# Patient Record
Sex: FEMALE | Race: ASIAN | NOT HISPANIC OR LATINO | Employment: FULL TIME | URBAN - METROPOLITAN AREA
[De-identification: names, ages, dates, MRNs, and addresses within clinical notes are randomized per-mention and may not be internally consistent; named-entity substitution may affect disease eponyms.]

---

## 2023-10-25 PROBLEM — Z76.89 ENCOUNTER TO ESTABLISH CARE: Status: ACTIVE | Noted: 2023-10-25

## 2023-11-02 NOTE — PROGRESS NOTES
"Date: 11/08/2023  Patient ID: [id number]   Chief Complaint: Establish Care (New patient to establish care)    HPI:   Rosalba Alejandro is a 23 y.o. female who is here today to establish care  On contraceptives that was started in [facility] for PCOS. LMP 10/14/2023, regular by OCP. Would like TV US and OBGYN referral to check if improving. Also would like to check Hep panel since  is HepB positive starting 2022.    Past Medical History:   Diagnosis Date    Anemia, unspecified     Exposure to hepatitis B     PCOS (polycystic ovarian syndrome)     9 mos on contraceptive, keeps regular    Recurrent UTI      History reviewed. No pertinent surgical history.  Review of patient's allergies indicates:  No Known Allergies  Outpatient Medications Marked as Taking for the 11/8/23 encounter (Office Visit) with Isabel Schrader MD   Medication Sig Dispense Refill    estradioL (ESTRACE) 1 MG tablet Take 1 mg by mouth once daily.       Family History   Problem Relation Age of Onset    Hypertension Mother       Social History     Socioeconomic History    Marital status:    Occupational History    Occupation: nurse at [employer]   Tobacco Use    Smoking status: Never    Smokeless tobacco: Never   Substance and Sexual Activity    Alcohol use: Not Currently     Comment: occasional    Drug use: Never    Sexual activity: Yes     Partners: Male     Birth control/protection: OCP     Comment:    Social History Narrative    planning     Patient Care Team:  Isabel Schrader MD as PCP - General (Family Medicine)   Subjective:   ROS  See HPI for details  All Other ROS: Negative except as stated in HPI  Objective:   /86   Pulse 94   Temp 98.9 °F (37.2 °C)   Ht 4' 11" (1.499 m)   Wt 70.4 kg (155 lb 3.2 oz)   SpO2 98%   BMI 31.35 kg/m²   Physical Exam  General: NAD  Eye: EOMI  HENT: no nasal discharge  Respiratory: non-labored breathing  Musculoskeletal: ambulates independently. No obvious deformity  Integumentary: no " apparent discoloration  Neurologic: Alert, oriented to person and situation  Cognition and Speech: Speech clear and coherent.   Psychiatric: Cooperative  Assessment:       ICD-10-CM ICD-9-CM   1. Encounter to establish care  Z76.89 V65.8   2. PCOS (polycystic ovarian syndrome)  E28.2 256.4   3. Exposure to hepatitis B  Z20.5 V01.79      Plan:   1. Encounter to establish care  Overview:  Obtain routine labs  F/u for wellness      Orders:  -     CBC Auto Differential; Future; Expected date: 11/08/2023  -     Comprehensive Metabolic Panel; Future; Expected date: 11/08/2023  -     Lipid Panel; Future; Expected date: 11/08/2023  -     TSH; Future; Expected date: 11/08/2023  -     Hemoglobin A1C; Future; Expected date: 11/08/2023  -     Urinalysis; Future; Expected date: 11/08/2023  -     T4, Free; Future; Expected date: 11/08/2023  -     Vitamin D; Future; Expected date: 11/08/2023  -     Hepatitis C Antibody; Future; Expected date: 11/08/2023  -     HIV 1/2 Ag/Ab (4th Gen); Future; Expected date: 11/08/2023    2. PCOS (polycystic ovarian syndrome)  Overview:  Continue contraceptive  F/u with US and OBGYN    Orders:  -     US Pelvis Complete Non OB; Future; Expected date: 11/08/2023  -     Ambulatory referral/consult to Obstetrics / Gynecology; Future; Expected date: 11/15/2023    3. Exposure to hepatitis B  Overview:  Obtain hepatitis panel    Orders:  -     Hepatitis Panel, Acute; Future; Expected date: 11/08/2023                Follow up in about 4 weeks (around 12/6/2023) for Wellness. In addition to their scheduled follow up, the patient has also been instructed to follow up on as needed basis.

## 2023-11-08 ENCOUNTER — OFFICE VISIT (OUTPATIENT)
Dept: PRIMARY CARE CLINIC | Facility: CLINIC | Age: 23
End: 2023-11-08
Payer: COMMERCIAL

## 2023-11-08 VITALS
DIASTOLIC BLOOD PRESSURE: 86 MMHG | HEIGHT: 59 IN | OXYGEN SATURATION: 98 % | WEIGHT: 155.19 LBS | BODY MASS INDEX: 31.28 KG/M2 | SYSTOLIC BLOOD PRESSURE: 123 MMHG | TEMPERATURE: 99 F | HEART RATE: 94 BPM

## 2023-11-08 DIAGNOSIS — E28.2 PCOS (POLYCYSTIC OVARIAN SYNDROME): ICD-10-CM

## 2023-11-08 DIAGNOSIS — Z20.5 EXPOSURE TO HEPATITIS B: ICD-10-CM

## 2023-11-08 DIAGNOSIS — Z76.89 ENCOUNTER TO ESTABLISH CARE: Primary | ICD-10-CM

## 2023-11-08 PROCEDURE — 3008F PR BODY MASS INDEX (BMI) DOCUMENTED: ICD-10-PCS | Mod: CPTII,,, | Performed by: STUDENT IN AN ORGANIZED HEALTH CARE EDUCATION/TRAINING PROGRAM

## 2023-11-08 PROCEDURE — 99204 PR OFFICE/OUTPT VISIT, NEW, LEVL IV, 45-59 MIN: ICD-10-PCS | Mod: ,,, | Performed by: STUDENT IN AN ORGANIZED HEALTH CARE EDUCATION/TRAINING PROGRAM

## 2023-11-08 PROCEDURE — 3074F PR MOST RECENT SYSTOLIC BLOOD PRESSURE < 130 MM HG: ICD-10-PCS | Mod: CPTII,,, | Performed by: STUDENT IN AN ORGANIZED HEALTH CARE EDUCATION/TRAINING PROGRAM

## 2023-11-08 PROCEDURE — 1159F PR MEDICATION LIST DOCUMENTED IN MEDICAL RECORD: ICD-10-PCS | Mod: CPTII,,, | Performed by: STUDENT IN AN ORGANIZED HEALTH CARE EDUCATION/TRAINING PROGRAM

## 2023-11-08 PROCEDURE — 99204 OFFICE O/P NEW MOD 45 MIN: CPT | Mod: ,,, | Performed by: STUDENT IN AN ORGANIZED HEALTH CARE EDUCATION/TRAINING PROGRAM

## 2023-11-08 PROCEDURE — 1159F MED LIST DOCD IN RCRD: CPT | Mod: CPTII,,, | Performed by: STUDENT IN AN ORGANIZED HEALTH CARE EDUCATION/TRAINING PROGRAM

## 2023-11-08 PROCEDURE — 3079F PR MOST RECENT DIASTOLIC BLOOD PRESSURE 80-89 MM HG: ICD-10-PCS | Mod: CPTII,,, | Performed by: STUDENT IN AN ORGANIZED HEALTH CARE EDUCATION/TRAINING PROGRAM

## 2023-11-08 PROCEDURE — 3008F BODY MASS INDEX DOCD: CPT | Mod: CPTII,,, | Performed by: STUDENT IN AN ORGANIZED HEALTH CARE EDUCATION/TRAINING PROGRAM

## 2023-11-08 PROCEDURE — 1160F PR REVIEW ALL MEDS BY PRESCRIBER/CLIN PHARMACIST DOCUMENTED: ICD-10-PCS | Mod: CPTII,,, | Performed by: STUDENT IN AN ORGANIZED HEALTH CARE EDUCATION/TRAINING PROGRAM

## 2023-11-08 PROCEDURE — 3074F SYST BP LT 130 MM HG: CPT | Mod: CPTII,,, | Performed by: STUDENT IN AN ORGANIZED HEALTH CARE EDUCATION/TRAINING PROGRAM

## 2023-11-08 PROCEDURE — 3079F DIAST BP 80-89 MM HG: CPT | Mod: CPTII,,, | Performed by: STUDENT IN AN ORGANIZED HEALTH CARE EDUCATION/TRAINING PROGRAM

## 2023-11-08 PROCEDURE — 1160F RVW MEDS BY RX/DR IN RCRD: CPT | Mod: CPTII,,, | Performed by: STUDENT IN AN ORGANIZED HEALTH CARE EDUCATION/TRAINING PROGRAM

## 2023-11-08 RX ORDER — ESTRADIOL 1 MG/1
1 TABLET ORAL DAILY
COMMUNITY
End: 2023-12-18 | Stop reason: SDUPTHER

## 2023-11-13 ENCOUNTER — HOSPITAL ENCOUNTER (OUTPATIENT)
Dept: RADIOLOGY | Facility: HOSPITAL | Age: 23
Discharge: HOME | End: 2023-11-13
Attending: STUDENT IN AN ORGANIZED HEALTH CARE EDUCATION/TRAINING PROGRAM
Payer: COMMERCIAL

## 2023-11-13 DIAGNOSIS — E28.2 PCOS (POLYCYSTIC OVARIAN SYNDROME): ICD-10-CM

## 2023-11-13 PROCEDURE — 76856 US EXAM PELVIC COMPLETE: CPT | Mod: TC

## 2023-11-14 NOTE — PROGRESS NOTES
Recent US showed normal pelvis, There is no sonographic evidence of polycystic ovarian syndrome.      Thank you,    Dr. BRAXTON

## 2023-11-27 PROBLEM — Z00.00 WELLNESS EXAMINATION: Status: ACTIVE | Noted: 2023-10-25

## 2023-11-27 NOTE — ASSESSMENT & PLAN NOTE
Provided Rosalba Britt Alejandro with a 5-10 year written screening schedule and personal prevention plan. Recommendations were developed using the USPSTF age appropriate recommendations. Education, counseling, and referrals were provided as needed. After Visit Summary printed and given to patient which includes a list of additional screenings\tests needed.

## 2023-11-27 NOTE — PROGRESS NOTES
Date: 12/07/2023  Patient ID: [id number]   Chief Complaint: Annual Exam    HPI:   Rosalba Alejandro is a 23 y.o. female here today for an annual wellness visit. Reviewed and discussed lab results. Recent US showed normal pelvis, There is no sonographic evidence of polycystic ovarian syndrome.  Labs showed low vitamin-D, elevated WBCs and eosinophils-pt has intermittent rash; A1c 5.9; WNL TFTs, hepatitis-C, HIV. Urine grew bacteria, but she denies dysuria/frequency/urgency or fevers.  Overall she feels well. No other complaints today. LMP 11/13/23    Diet: average  Activity level: plan in future for New Year resolution    Patient Active Problem List   Diagnosis    Wellness examination    PCOS (polycystic ovarian syndrome)    Exposure to hepatitis B    Vitamin D deficiency    IGT (impaired glucose tolerance)    Leukocytosis    Urine culture positive     Outpatient Medications Marked as Taking for the 12/7/23 encounter (Office Visit) with Isabel Schrader MD   Medication Sig Dispense Refill    estradioL (ESTRACE) 1 MG tablet Take 1 mg by mouth once daily.       Past Medical History:   Diagnosis Date    Anemia, unspecified     Exposure to hepatitis B     PCOS (polycystic ovarian syndrome)     9 mos on contraceptive, keeps regular    Recurrent UTI     Tension headache      History reviewed. No pertinent surgical history.  Review of patient's allergies indicates:  No Known Allergies  Family History   Problem Relation Age of Onset    Hypertension Mother       Social History     Socioeconomic History    Marital status:    Occupational History    Occupation: nurse at [employer]   Tobacco Use    Smoking status: Never    Smokeless tobacco: Never   Substance and Sexual Activity    Alcohol use: Not Currently     Comment: occasional    Drug use: Never    Sexual activity: Yes     Partners: Male     Birth control/protection: OCP     Comment:    Social History Narrative    planning     Social Determinants of Health      Financial Resource Strain: Low Risk  (12/7/2023)    Overall Financial Resource Strain (CARDIA)     Difficulty of Paying Living Expenses: Not hard at all   Food Insecurity: No Food Insecurity (12/7/2023)    Hunger Vital Sign     Worried About Running Out of Food in the Last Year: Never true     Ran Out of Food in the Last Year: Never true   Transportation Needs: No Transportation Needs (12/7/2023)    PRAPARE - Transportation     Lack of Transportation (Medical): No     Lack of Transportation (Non-Medical): No   Physical Activity: Inactive (12/7/2023)    Exercise Vital Sign     Days of Exercise per Week: 0 days     Minutes of Exercise per Session: 0 min   Stress: No Stress Concern Present (12/7/2023)    [employer] - Occupational Stress Questionnaire     Feeling of Stress : Not at all   Social Connections: Moderately Integrated (12/7/2023)    Social Connection and Isolation Panel [NHANES]     Frequency of Communication with Friends and Family: More than three times a week     Frequency of Social Gatherings with Friends and Family: Twice a week     Attends Latter-day Services: More than 4 times per year     Active Member of Clubs or Organizations: No     Attends Club or Organization Meetings: Never     Marital Status:    Housing Stability: Unknown (12/7/2023)    Housing Stability Vital Sign     Unable to Pay for Housing in the Last Year: No     Unstable Housing in the Last Year: No     Patient Care Team:  Isabel Schrader MD as PCP - General (Family Medicine)     Subjective:     Review of Systems   Constitutional: Negative.  Negative for fever and weight loss.   HENT:  Negative for congestion, hearing loss and sore throat.    Eyes:  Negative for blurred vision.   Respiratory:  Negative for cough and shortness of breath.    Cardiovascular:  Negative for chest pain, palpitations and leg swelling.   Gastrointestinal:  Negative for abdominal pain, blood in stool, constipation,  "diarrhea, nausea and vomiting.   Genitourinary:  Negative for dysuria, frequency, hematuria and urgency.   Musculoskeletal:  Negative for joint pain.   Skin:  Negative for rash.   Neurological:  Negative for weakness and headaches.   Psychiatric/Behavioral:  Negative for depression. The patient is not nervous/anxious and does not have insomnia.        See HPI for details  All Other ROS: Negative except as stated in HPI    Objective:     /76   Pulse 95   Temp 98.5 °F (36.9 °C)   Ht 4' 11" (1.499 m)   Wt 70.4 kg (155 lb 1.6 oz)   SpO2 95%   BMI 31.33 kg/m²     Physical Exam  Vitals reviewed.   Constitutional:       Appearance: Normal appearance.   HENT:      Head: Normocephalic and atraumatic.      Right Ear: Tympanic membrane, ear canal and external ear normal.      Left Ear: Tympanic membrane, ear canal and external ear normal.      Nose: Nose normal. No congestion or rhinorrhea.      Mouth/Throat:      Mouth: Mucous membranes are moist.      Pharynx: Oropharynx is clear.   Eyes:      General: No scleral icterus.     Extraocular Movements: Extraocular movements intact.      Conjunctiva/sclera: Conjunctivae normal.   Cardiovascular:      Rate and Rhythm: Normal rate and regular rhythm.      Pulses: Normal pulses.      Heart sounds: Normal heart sounds.   Pulmonary:      Effort: Pulmonary effort is normal.      Breath sounds: Normal breath sounds.   Abdominal:      General: Abdomen is flat. Bowel sounds are normal.      Palpations: Abdomen is soft.      Tenderness: There is no abdominal tenderness.   Musculoskeletal:         General: No swelling or deformity. Normal range of motion.      Cervical back: Normal range of motion and neck supple.   Skin:     General: Skin is warm and dry.   Neurological:      Mental Status: She is alert and oriented to person, place, and time.   Psychiatric:         Mood and Affect: Mood normal.         Behavior: Behavior normal.         Thought Content: Thought content normal. "         Judgment: Judgment normal.         Assessment:       ICD-10-CM ICD-9-CM   1. Wellness examination  Z00.00 V70.0   2. Vitamin D deficiency  E55.9 268.9   3. IGT (impaired glucose tolerance)  R73.02 790.22   4. Leukocytosis, unspecified type  D72.829 288.60   5. Urine culture positive  R82.79 791.9        Plan:     1. Wellness examination  Assessment & Plan:  Provided Rosalba Alejandro with a 5-10 year written screening schedule and personal prevention plan. Recommendations were developed using the USPSTF age appropriate recommendations. Education, counseling, and referrals were provided as needed. After Visit Summary printed and given to patient which includes a list of additional screenings\tests needed.         2. Vitamin D deficiency  Assessment & Plan:  Vitamin D 50,000u po qwk x 12wks  Recheck level after completed supplementation      Orders:  -     Vitamin D; Future; Expected date: 12/07/2023  -     ergocalciferol (ERGOCALCIFEROL) 50,000 unit Cap; Take 1 capsule (50,000 Units total) by mouth every 7 days.  Dispense: 12 capsule; Refill: 02    3. IGT (impaired glucose tolerance)  Assessment & Plan:  Prediabetic if A1c 5.7-6.4%, fasting glucose 100-125 mg/dL  Recommend 7% weight loss and exercise 150 minutes per week, decreased caloric intake (consider Mediterranean, DASH, vegetarian diet)  Follow ADA Diet. Avoid soda, simple sweets, and limit rice/pasta/breads/starches (no more than 45-50 grams per meal).  Maintain healthy weight with goal BMI <30.  Exercise at least 5 times per week for 30 minutes per day.  Associated with improved insulin sensitivity: Dietary fiber, coffee, Sinemet    Orders:  -     Hemoglobin A1C; Future; Expected date: 12/07/2023    4. Leukocytosis, unspecified type  Assessment & Plan:  Recheck at routine interval  Consider further investigation if still abnormal    Orders:  -     CBC Auto Differential; Future; Expected date: 12/07/2023    5. Urine culture positive  Assessment &  Plan:  If patient symptomatic, start abx/obtain repeat UA and Cx    Orders:  -     Urinalysis; Future; Expected date: 12/07/2023         Medication List with Changes/Refills   New Medications    ERGOCALCIFEROL (ERGOCALCIFEROL) 50,000 UNIT CAP    Take 1 capsule (50,000 Units total) by mouth every 7 days.       Start Date: 12/7/2023 End Date: 2/23/2024   Current Medications    ESTRADIOL (ESTRACE) 1 MG TABLET    Take 1 mg by mouth once daily.       Start Date: --        End Date: --        The patient's Health Maintenance was reviewed and the following appears to be due at this time:   Health Maintenance Due   Topic Date Due    COVID-19 Vaccine (1) Never done    HPV Vaccines (1 - 2-dose series) Never done    TETANUS VACCINE  Never done    Pap Smear  Never done     The patient has no Health Maintenance topics of status Not Due     Alcohol/Tobacco Use - Discussed importance of smoking avoidance/cessation and limiting alcohol intake.    CVD Risk Factors - Reviewed and discussed with patient    Obesity/Physical Activity - BMI = Body mass index is 31.33 kg/m².. Encouraged 30 minute daily physical activity, 5 days per week.     STD screening (At least once 15-65y or when necessary) - negative     Depression screening (Every year or when necessary)- negative    Osteoporosis Screening (start at 64yo or 50y with risk) - future    Colon Cancer Screening - future    Cervical Cancer Screening - future    Breast Cancer Screening - future    Eye Exam - Recommend annual eye exams.    Dental Exam - Recommend biannual exams.     Vaccinations - will obtain at pharmacy    Follow up in about 6 months (around 6/7/2024) for Chronic Conditions, Vit D, IGT. In addition to their scheduled follow up, the patient has also been instructed to follow up on as needed basis.

## 2023-11-29 ENCOUNTER — CLINICAL SUPPORT (OUTPATIENT)
Dept: PRIMARY CARE CLINIC | Facility: CLINIC | Age: 23
End: 2023-11-29
Payer: COMMERCIAL

## 2023-11-29 DIAGNOSIS — Z23 NEED FOR VACCINATION: Primary | ICD-10-CM

## 2023-11-29 DIAGNOSIS — Z20.5 EXPOSURE TO HEPATITIS B: ICD-10-CM

## 2023-11-29 DIAGNOSIS — Z76.89 ENCOUNTER TO ESTABLISH CARE: ICD-10-CM

## 2023-11-29 LAB
ALBUMIN SERPL-MCNC: 3.4 G/DL (ref 3.5–5)
ALBUMIN/GLOB SERPL: 0.9 RATIO (ref 1.1–2)
ALP SERPL-CCNC: 53 UNIT/L (ref 40–150)
ALT SERPL-CCNC: 14 UNIT/L (ref 0–55)
APPEARANCE UR: ABNORMAL
AST SERPL-CCNC: 19 UNIT/L (ref 5–34)
BACTERIA #/AREA URNS AUTO: ABNORMAL /HPF
BASOPHILS # BLD AUTO: 0.09 X10(3)/MCL
BASOPHILS NFR BLD AUTO: 0.7 %
BILIRUB SERPL-MCNC: 0.5 MG/DL
BILIRUB UR QL STRIP.AUTO: NEGATIVE
BUN SERPL-MCNC: 10.5 MG/DL (ref 7–18.7)
CALCIUM SERPL-MCNC: 9 MG/DL (ref 8.4–10.2)
CHLORIDE SERPL-SCNC: 109 MMOL/L (ref 98–107)
CHOLEST SERPL-MCNC: 200 MG/DL
CHOLEST/HDLC SERPL: 3 {RATIO} (ref 0–5)
CO2 SERPL-SCNC: 13 MMOL/L (ref 22–29)
COLOR UR AUTO: YELLOW
CREAT SERPL-MCNC: 0.7 MG/DL (ref 0.55–1.02)
DEPRECATED CALCIDIOL+CALCIFEROL SERPL-MC: 23.5 NG/ML (ref 30–80)
EOSINOPHIL # BLD AUTO: 1.06 X10(3)/MCL (ref 0–0.9)
EOSINOPHIL NFR BLD AUTO: 8.3 %
ERYTHROCYTE [DISTWIDTH] IN BLOOD BY AUTOMATED COUNT: 12.1 % (ref 11.5–17)
EST. AVERAGE GLUCOSE BLD GHB EST-MCNC: 122.6 MG/DL
GFR SERPLBLD CREATININE-BSD FMLA CKD-EPI: >60 MLS/MIN/1.73/M2
GLOBULIN SER-MCNC: 4 GM/DL (ref 2.4–3.5)
GLUCOSE SERPL-MCNC: 88 MG/DL (ref 74–100)
GLUCOSE UR QL STRIP.AUTO: NORMAL
HAV IGM SERPL QL IA: NONREACTIVE
HBA1C MFR BLD: 5.9 %
HBV CORE IGM SERPL QL IA: NONREACTIVE
HBV SURFACE AG SERPL QL IA: NONREACTIVE
HCT VFR BLD AUTO: 42.5 % (ref 37–47)
HCV AB SERPL QL IA: NONREACTIVE
HDLC SERPL-MCNC: 64 MG/DL (ref 35–60)
HGB BLD-MCNC: 14.5 G/DL (ref 12–16)
HIV 1+2 AB+HIV1 P24 AG SERPL QL IA: NONREACTIVE
IMM GRANULOCYTES # BLD AUTO: 0.06 X10(3)/MCL (ref 0–0.04)
IMM GRANULOCYTES NFR BLD AUTO: 0.5 %
KETONES UR QL STRIP.AUTO: NEGATIVE
LDLC SERPL CALC-MCNC: 109 MG/DL (ref 50–140)
LEUKOCYTE ESTERASE UR QL STRIP.AUTO: 25
LYMPHOCYTES # BLD AUTO: 3.27 X10(3)/MCL (ref 0.6–4.6)
LYMPHOCYTES NFR BLD AUTO: 25.7 %
MCH RBC QN AUTO: 27.4 PG (ref 27–31)
MCHC RBC AUTO-ENTMCNC: 34.1 G/DL (ref 33–36)
MCV RBC AUTO: 80.2 FL (ref 80–94)
MONOCYTES # BLD AUTO: 0.71 X10(3)/MCL (ref 0.1–1.3)
MONOCYTES NFR BLD AUTO: 5.6 %
MUCOUS THREADS URNS QL MICRO: ABNORMAL /LPF
NEUTROPHILS # BLD AUTO: 7.54 X10(3)/MCL (ref 2.1–9.2)
NEUTROPHILS NFR BLD AUTO: 59.2 %
NITRITE UR QL STRIP.AUTO: NEGATIVE
NRBC BLD AUTO-RTO: 0 %
PH UR STRIP.AUTO: 6.5 [PH]
PLATELET # BLD AUTO: 346 X10(3)/MCL (ref 130–400)
PMV BLD AUTO: 9.9 FL (ref 7.4–10.4)
POTASSIUM SERPL-SCNC: 4.8 MMOL/L (ref 3.5–5.1)
PROT SERPL-MCNC: 7.4 GM/DL (ref 6.4–8.3)
PROT UR QL STRIP.AUTO: ABNORMAL
RBC # BLD AUTO: 5.3 X10(6)/MCL (ref 4.2–5.4)
RBC #/AREA URNS AUTO: ABNORMAL /HPF
RBC UR QL AUTO: ABNORMAL
SODIUM SERPL-SCNC: 137 MMOL/L (ref 136–145)
SP GR UR STRIP.AUTO: 1.02 (ref 1–1.03)
SQUAMOUS #/AREA URNS LPF: ABNORMAL /HPF
T4 FREE SERPL-MCNC: 1.25 NG/DL (ref 0.7–1.48)
TRIGL SERPL-MCNC: 137 MG/DL (ref 37–140)
TSH SERPL-ACNC: 1.59 UIU/ML (ref 0.35–4.94)
UROBILINOGEN UR STRIP-ACNC: NORMAL
VLDLC SERPL CALC-MCNC: 27 MG/DL
WBC # SPEC AUTO: 12.73 X10(3)/MCL (ref 4.5–11.5)
WBC #/AREA URNS AUTO: ABNORMAL /HPF

## 2023-11-29 PROCEDURE — 36415 COLL VENOUS BLD VENIPUNCTURE: CPT

## 2023-11-29 PROCEDURE — 90686 IIV4 VACC NO PRSV 0.5 ML IM: CPT | Mod: ,,, | Performed by: STUDENT IN AN ORGANIZED HEALTH CARE EDUCATION/TRAINING PROGRAM

## 2023-11-29 PROCEDURE — 90686 FLU VACCINE (QUAD) GREATER THAN OR EQUAL TO 3YO PRESERVATIVE FREE IM: ICD-10-PCS | Mod: ,,, | Performed by: STUDENT IN AN ORGANIZED HEALTH CARE EDUCATION/TRAINING PROGRAM

## 2023-11-29 PROCEDURE — 90471 FLU VACCINE (QUAD) GREATER THAN OR EQUAL TO 3YO PRESERVATIVE FREE IM: ICD-10-PCS | Mod: ,,, | Performed by: STUDENT IN AN ORGANIZED HEALTH CARE EDUCATION/TRAINING PROGRAM

## 2023-11-29 PROCEDURE — 90471 IMMUNIZATION ADMIN: CPT | Mod: ,,, | Performed by: STUDENT IN AN ORGANIZED HEALTH CARE EDUCATION/TRAINING PROGRAM

## 2023-12-01 LAB — BACTERIA UR CULT: ABNORMAL

## 2023-12-07 ENCOUNTER — OFFICE VISIT (OUTPATIENT)
Dept: PRIMARY CARE CLINIC | Facility: CLINIC | Age: 23
End: 2023-12-07
Payer: COMMERCIAL

## 2023-12-07 VITALS
TEMPERATURE: 99 F | HEART RATE: 95 BPM | BODY MASS INDEX: 31.28 KG/M2 | OXYGEN SATURATION: 95 % | HEIGHT: 59 IN | DIASTOLIC BLOOD PRESSURE: 76 MMHG | SYSTOLIC BLOOD PRESSURE: 119 MMHG | WEIGHT: 155.13 LBS

## 2023-12-07 DIAGNOSIS — Z00.00 WELLNESS EXAMINATION: Primary | ICD-10-CM

## 2023-12-07 DIAGNOSIS — D72.829 LEUKOCYTOSIS, UNSPECIFIED TYPE: ICD-10-CM

## 2023-12-07 DIAGNOSIS — E55.9 VITAMIN D DEFICIENCY: ICD-10-CM

## 2023-12-07 DIAGNOSIS — R73.02 IGT (IMPAIRED GLUCOSE TOLERANCE): ICD-10-CM

## 2023-12-07 DIAGNOSIS — R82.79 URINE CULTURE POSITIVE: ICD-10-CM

## 2023-12-07 PROCEDURE — 3044F PR MOST RECENT HEMOGLOBIN A1C LEVEL <7.0%: ICD-10-PCS | Mod: CPTII,,, | Performed by: STUDENT IN AN ORGANIZED HEALTH CARE EDUCATION/TRAINING PROGRAM

## 2023-12-07 PROCEDURE — 1159F PR MEDICATION LIST DOCUMENTED IN MEDICAL RECORD: ICD-10-PCS | Mod: CPTII,,, | Performed by: STUDENT IN AN ORGANIZED HEALTH CARE EDUCATION/TRAINING PROGRAM

## 2023-12-07 PROCEDURE — 1160F RVW MEDS BY RX/DR IN RCRD: CPT | Mod: CPTII,,, | Performed by: STUDENT IN AN ORGANIZED HEALTH CARE EDUCATION/TRAINING PROGRAM

## 2023-12-07 PROCEDURE — 3078F PR MOST RECENT DIASTOLIC BLOOD PRESSURE < 80 MM HG: ICD-10-PCS | Mod: CPTII,,, | Performed by: STUDENT IN AN ORGANIZED HEALTH CARE EDUCATION/TRAINING PROGRAM

## 2023-12-07 PROCEDURE — 99395 PR PREVENTIVE VISIT,EST,18-39: ICD-10-PCS | Mod: ,,, | Performed by: STUDENT IN AN ORGANIZED HEALTH CARE EDUCATION/TRAINING PROGRAM

## 2023-12-07 PROCEDURE — 3078F DIAST BP <80 MM HG: CPT | Mod: CPTII,,, | Performed by: STUDENT IN AN ORGANIZED HEALTH CARE EDUCATION/TRAINING PROGRAM

## 2023-12-07 PROCEDURE — 99395 PREV VISIT EST AGE 18-39: CPT | Mod: ,,, | Performed by: STUDENT IN AN ORGANIZED HEALTH CARE EDUCATION/TRAINING PROGRAM

## 2023-12-07 PROCEDURE — 1160F PR REVIEW ALL MEDS BY PRESCRIBER/CLIN PHARMACIST DOCUMENTED: ICD-10-PCS | Mod: CPTII,,, | Performed by: STUDENT IN AN ORGANIZED HEALTH CARE EDUCATION/TRAINING PROGRAM

## 2023-12-07 PROCEDURE — 3074F PR MOST RECENT SYSTOLIC BLOOD PRESSURE < 130 MM HG: ICD-10-PCS | Mod: CPTII,,, | Performed by: STUDENT IN AN ORGANIZED HEALTH CARE EDUCATION/TRAINING PROGRAM

## 2023-12-07 PROCEDURE — 3008F PR BODY MASS INDEX (BMI) DOCUMENTED: ICD-10-PCS | Mod: CPTII,,, | Performed by: STUDENT IN AN ORGANIZED HEALTH CARE EDUCATION/TRAINING PROGRAM

## 2023-12-07 PROCEDURE — 3074F SYST BP LT 130 MM HG: CPT | Mod: CPTII,,, | Performed by: STUDENT IN AN ORGANIZED HEALTH CARE EDUCATION/TRAINING PROGRAM

## 2023-12-07 PROCEDURE — 1159F MED LIST DOCD IN RCRD: CPT | Mod: CPTII,,, | Performed by: STUDENT IN AN ORGANIZED HEALTH CARE EDUCATION/TRAINING PROGRAM

## 2023-12-07 PROCEDURE — 3044F HG A1C LEVEL LT 7.0%: CPT | Mod: CPTII,,, | Performed by: STUDENT IN AN ORGANIZED HEALTH CARE EDUCATION/TRAINING PROGRAM

## 2023-12-07 PROCEDURE — 3008F BODY MASS INDEX DOCD: CPT | Mod: CPTII,,, | Performed by: STUDENT IN AN ORGANIZED HEALTH CARE EDUCATION/TRAINING PROGRAM

## 2023-12-07 RX ORDER — ERGOCALCIFEROL 1.25 MG/1
50000 CAPSULE ORAL
Qty: 12 CAPSULE | Refills: 2 | Status: SHIPPED | OUTPATIENT
Start: 2023-12-07 | End: 2024-02-23

## 2023-12-07 NOTE — ASSESSMENT & PLAN NOTE
Prediabetic if A1c 5.7-6.4%, fasting glucose 100-125 mg/dL  Recommend 7% weight loss and exercise 150 minutes per week, decreased caloric intake (consider Mediterranean, DASH, vegetarian diet)  Follow ADA Diet. Avoid soda, simple sweets, and limit rice/pasta/breads/starches (no more than 45-50 grams per meal).  Maintain healthy weight with goal BMI <30.  Exercise at least 5 times per week for 30 minutes per day.  Associated with improved insulin sensitivity: Dietary fiber, coffee, Sinemet

## 2023-12-18 ENCOUNTER — OFFICE VISIT (OUTPATIENT)
Dept: PRIMARY CARE CLINIC | Facility: CLINIC | Age: 23
End: 2023-12-18
Payer: COMMERCIAL

## 2023-12-18 VITALS
DIASTOLIC BLOOD PRESSURE: 82 MMHG | TEMPERATURE: 98 F | BODY MASS INDEX: 31.85 KG/M2 | WEIGHT: 158 LBS | OXYGEN SATURATION: 96 % | SYSTOLIC BLOOD PRESSURE: 119 MMHG | HEART RATE: 107 BPM | HEIGHT: 59 IN

## 2023-12-18 DIAGNOSIS — Z01.419 ENCOUNTER FOR CERVICAL PAP SMEAR WITH PELVIC EXAM: Primary | ICD-10-CM

## 2023-12-18 DIAGNOSIS — E28.2 PCOS (POLYCYSTIC OVARIAN SYNDROME): ICD-10-CM

## 2023-12-18 PROCEDURE — 3044F PR MOST RECENT HEMOGLOBIN A1C LEVEL <7.0%: ICD-10-PCS | Mod: CPTII,,, | Performed by: STUDENT IN AN ORGANIZED HEALTH CARE EDUCATION/TRAINING PROGRAM

## 2023-12-18 PROCEDURE — 3044F HG A1C LEVEL LT 7.0%: CPT | Mod: CPTII,,, | Performed by: STUDENT IN AN ORGANIZED HEALTH CARE EDUCATION/TRAINING PROGRAM

## 2023-12-18 PROCEDURE — 1160F PR REVIEW ALL MEDS BY PRESCRIBER/CLIN PHARMACIST DOCUMENTED: ICD-10-PCS | Mod: CPTII,,, | Performed by: STUDENT IN AN ORGANIZED HEALTH CARE EDUCATION/TRAINING PROGRAM

## 2023-12-18 PROCEDURE — 1160F RVW MEDS BY RX/DR IN RCRD: CPT | Mod: CPTII,,, | Performed by: STUDENT IN AN ORGANIZED HEALTH CARE EDUCATION/TRAINING PROGRAM

## 2023-12-18 PROCEDURE — 3008F PR BODY MASS INDEX (BMI) DOCUMENTED: ICD-10-PCS | Mod: CPTII,,, | Performed by: STUDENT IN AN ORGANIZED HEALTH CARE EDUCATION/TRAINING PROGRAM

## 2023-12-18 PROCEDURE — 3008F BODY MASS INDEX DOCD: CPT | Mod: CPTII,,, | Performed by: STUDENT IN AN ORGANIZED HEALTH CARE EDUCATION/TRAINING PROGRAM

## 2023-12-18 PROCEDURE — 3074F SYST BP LT 130 MM HG: CPT | Mod: CPTII,,, | Performed by: STUDENT IN AN ORGANIZED HEALTH CARE EDUCATION/TRAINING PROGRAM

## 2023-12-18 PROCEDURE — 99213 PR OFFICE/OUTPT VISIT, EST, LEVL III, 20-29 MIN: ICD-10-PCS | Mod: ,,, | Performed by: STUDENT IN AN ORGANIZED HEALTH CARE EDUCATION/TRAINING PROGRAM

## 2023-12-18 PROCEDURE — 1159F PR MEDICATION LIST DOCUMENTED IN MEDICAL RECORD: ICD-10-PCS | Mod: CPTII,,, | Performed by: STUDENT IN AN ORGANIZED HEALTH CARE EDUCATION/TRAINING PROGRAM

## 2023-12-18 PROCEDURE — 1159F MED LIST DOCD IN RCRD: CPT | Mod: CPTII,,, | Performed by: STUDENT IN AN ORGANIZED HEALTH CARE EDUCATION/TRAINING PROGRAM

## 2023-12-18 PROCEDURE — 99213 OFFICE O/P EST LOW 20 MIN: CPT | Mod: ,,, | Performed by: STUDENT IN AN ORGANIZED HEALTH CARE EDUCATION/TRAINING PROGRAM

## 2023-12-18 PROCEDURE — 3079F PR MOST RECENT DIASTOLIC BLOOD PRESSURE 80-89 MM HG: ICD-10-PCS | Mod: CPTII,,, | Performed by: STUDENT IN AN ORGANIZED HEALTH CARE EDUCATION/TRAINING PROGRAM

## 2023-12-18 PROCEDURE — 3079F DIAST BP 80-89 MM HG: CPT | Mod: CPTII,,, | Performed by: STUDENT IN AN ORGANIZED HEALTH CARE EDUCATION/TRAINING PROGRAM

## 2023-12-18 PROCEDURE — 3074F PR MOST RECENT SYSTOLIC BLOOD PRESSURE < 130 MM HG: ICD-10-PCS | Mod: CPTII,,, | Performed by: STUDENT IN AN ORGANIZED HEALTH CARE EDUCATION/TRAINING PROGRAM

## 2023-12-18 RX ORDER — ESTRADIOL 1 MG/1
1 TABLET ORAL DAILY
Qty: 90 TABLET | Refills: 3 | Status: SHIPPED | OUTPATIENT
Start: 2023-12-18

## 2023-12-18 NOTE — PROGRESS NOTES
Date: 12/18/2023  Patient ID: [id number]   Chief Complaint: Gynecologic Exam    HPI:   Rosalba Alejandro is a 23 y.o. female here today for Gynecologic Exam  LMP:12/13/2023  Menstrual bleeding (pattern, severity/clots/flooding/pain+tx): regular, monthly. Taking estradiol for PCOS. Denies pelvic pain/abnormal discharge    Patient Active Problem List   Diagnosis    Encounter for cervical Pap smear with pelvic exam    PCOS (polycystic ovarian syndrome)    Exposure to hepatitis B    Vitamin D deficiency    IGT (impaired glucose tolerance)    Leukocytosis    Urine culture positive     Outpatient Medications Marked as Taking for the 12/18/23 encounter (Office Visit) with Isabel Schrader MD   Medication Sig Dispense Refill    ergocalciferol (ERGOCALCIFEROL) 50,000 unit Cap Take 1 capsule (50,000 Units total) by mouth every 7 days. 12 capsule 02    [DISCONTINUED] estradioL (ESTRACE) 1 MG tablet Take 1 mg by mouth once daily.       Past Medical History:   Diagnosis Date    Anemia, unspecified     Exposure to hepatitis B     PCOS (polycystic ovarian syndrome)     9 mos on contraceptive, keeps regular    Recurrent UTI     Tension headache      History reviewed. No pertinent surgical history.  Review of patient's allergies indicates:  No Known Allergies  Family History   Problem Relation Age of Onset    Hypertension Mother       Social History     Socioeconomic History    Marital status:    Occupational History    Occupation: nurse at [employer]   Tobacco Use    Smoking status: Never    Smokeless tobacco: Never   Substance and Sexual Activity    Alcohol use: Not Currently     Comment: occasional    Drug use: Never    Sexual activity: Yes     Partners: Male     Birth control/protection: OCP     Comment:    Social History Narrative    planning     Social Determinants of Health     Financial Resource Strain: Low Risk  (12/18/2023)    Overall Financial Resource Strain (CARDIA)     Difficulty of Paying Living Expenses: Not  "very hard   Food Insecurity: Food Insecurity Present (12/18/2023)    Hunger Vital Sign     Worried About Running Out of Food in the Last Year: Sometimes true     Ran Out of Food in the Last Year: Never true   Transportation Needs: No Transportation Needs (12/18/2023)    PRAPARE - Transportation     Lack of Transportation (Medical): No     Lack of Transportation (Non-Medical): No   Physical Activity: Inactive (12/18/2023)    Exercise Vital Sign     Days of Exercise per Week: 1 day     Minutes of Exercise per Session: 0 min   Stress: No Stress Concern Present (12/7/2023)    [employer] - Occupational Stress Questionnaire     Feeling of Stress : Not at all   Social Connections: Moderately Integrated (12/18/2023)    Social Connection and Isolation Panel [NHANES]     Frequency of Communication with Friends and Family: Three times a week     Frequency of Social Gatherings with Friends and Family: Once a week     Attends Adventism Services: More than 4 times per year     Active Member of Clubs or Organizations: No     Attends Club or Organization Meetings: Never     Marital Status:    Housing Stability: Low Risk  (12/18/2023)    Housing Stability Vital Sign     Unable to Pay for Housing in the Last Year: No     Number of Places Lived in the Last Year: 1     Unstable Housing in the Last Year: No     Patient Care Team:  Isabel Schrader MD as PCP - General (Family Medicine)   Subjective:   ROS  See HPI for details  All Other ROS: Negative except as stated in HPI.   Objective:   /82   Pulse 107   Temp 98.4 °F (36.9 °C)   Ht 4' 11" (1.499 m)   Wt 71.7 kg (158 lb)   SpO2 96%   BMI 31.91 kg/m²   Physical Exam  General: NAD  Eye: EOMI, no scleral icterus  HENT: no nasal discharge  Respiratory: unlabored breathing  Cardiovascular: no edema b/l LE  Gastrointestinal: soft, non-distended, NTTP, no rebound tenderness, no guarding  Musculoskeletal: ambulates independently  Integumentary: " warm, dry  Neurologic: Alert, oriented to person and situation  Cognition and Speech: Speech clear and coherent.   Psychiatric: Cooperative  Genitourinary:  -External: normal appearing vulva/vaginal wall/cervix; no lesions/ulcers/masses; no discharge/bleeding; normal appearing urethral meatus; normal appearing external anus  -Speculum: external cervical os even and without bleeding, discharge, lesions; pap obtained; vaginal mucosa normal in appearance  -Bimanual exam: no masses palpated, no tenderness; limited due to body habitus  Assessment:       ICD-10-CM ICD-9-CM   1. Encounter for cervical Pap smear with pelvic exam  Z01.419 V76.2     V72.31   2. PCOS (polycystic ovarian syndrome)  E28.2 256.4      Plan:   1. Encounter for cervical Pap smear with pelvic exam  Assessment & Plan:  Obtained pap  F/u with results  Call if issues arise      Orders:  -     Liquid-based pap smear, screening  -     Liquid-Based Pap Smear, Screening Screening    2. PCOS (polycystic ovarian syndrome)  Overview:  Recent US benign    Assessment & Plan:  Continue contraceptive  F/u with OBGYN    Orders:  -     estradioL (ESTRACE) 1 MG tablet; Take 1 tablet (1 mg total) by mouth once daily.  Dispense: 90 tablet; Refill: 3         Medication List with Changes/Refills   Current Medications    ERGOCALCIFEROL (ERGOCALCIFEROL) 50,000 UNIT CAP    Take 1 capsule (50,000 Units total) by mouth every 7 days.       Start Date: 12/7/2023 End Date: 2/23/2024   Changed and/or Refilled Medications    Modified Medication Previous Medication    ESTRADIOL (ESTRACE) 1 MG TABLET estradioL (ESTRACE) 1 MG tablet       Take 1 tablet (1 mg total) by mouth once daily.    Take 1 mg by mouth once daily.       Start Date: 12/18/2023End Date: --    Start Date: --        End Date: 12/18/2023          Follow up if symptoms worsen or fail to improve, for keep next appt. In addition to their scheduled follow up, the patient has also been instructed to follow up on as needed  basis.

## 2023-12-22 LAB — PSYCHE PATHOLOGY RESULT: NORMAL

## 2024-02-15 LAB — PATH REV: NORMAL

## 2024-06-03 ENCOUNTER — LAB VISIT (OUTPATIENT)
Dept: LAB | Facility: HOSPITAL | Age: 24
End: 2024-06-03
Attending: STUDENT IN AN ORGANIZED HEALTH CARE EDUCATION/TRAINING PROGRAM
Payer: COMMERCIAL

## 2024-06-03 DIAGNOSIS — E55.9 VITAMIN D DEFICIENCY: ICD-10-CM

## 2024-06-03 DIAGNOSIS — R73.02 IGT (IMPAIRED GLUCOSE TOLERANCE): ICD-10-CM

## 2024-06-03 DIAGNOSIS — D72.829 LEUKOCYTOSIS, UNSPECIFIED TYPE: ICD-10-CM

## 2024-06-03 LAB
25(OH)D3+25(OH)D2 SERPL-MCNC: 21 NG/ML (ref 30–80)
BASOPHILS # BLD AUTO: 0.08 X10(3)/MCL
BASOPHILS NFR BLD AUTO: 0.7 %
EOSINOPHIL # BLD AUTO: 0.6 X10(3)/MCL (ref 0–0.9)
EOSINOPHIL NFR BLD AUTO: 5 %
ERYTHROCYTE [DISTWIDTH] IN BLOOD BY AUTOMATED COUNT: 12.1 % (ref 11.5–17)
EST. AVERAGE GLUCOSE BLD GHB EST-MCNC: 114 MG/DL
HBA1C MFR BLD: 5.6 %
HCT VFR BLD AUTO: 43.1 % (ref 37–47)
HGB BLD-MCNC: 14.1 G/DL (ref 12–16)
IMM GRANULOCYTES # BLD AUTO: 0.05 X10(3)/MCL (ref 0–0.04)
IMM GRANULOCYTES NFR BLD AUTO: 0.4 %
LYMPHOCYTES # BLD AUTO: 3.48 X10(3)/MCL (ref 0.6–4.6)
LYMPHOCYTES NFR BLD AUTO: 29 %
MCH RBC QN AUTO: 27.1 PG (ref 27–31)
MCHC RBC AUTO-ENTMCNC: 32.7 G/DL (ref 33–36)
MCV RBC AUTO: 82.7 FL (ref 80–94)
MONOCYTES # BLD AUTO: 0.93 X10(3)/MCL (ref 0.1–1.3)
MONOCYTES NFR BLD AUTO: 7.7 %
NEUTROPHILS # BLD AUTO: 6.87 X10(3)/MCL (ref 2.1–9.2)
NEUTROPHILS NFR BLD AUTO: 57.2 %
NRBC BLD AUTO-RTO: 0 %
PLATELET # BLD AUTO: 318 X10(3)/MCL (ref 130–400)
PMV BLD AUTO: 9.5 FL (ref 7.4–10.4)
RBC # BLD AUTO: 5.21 X10(6)/MCL (ref 4.2–5.4)
WBC # SPEC AUTO: 12.01 X10(3)/MCL (ref 4.5–11.5)

## 2024-06-03 PROCEDURE — 83036 HEMOGLOBIN GLYCOSYLATED A1C: CPT

## 2024-06-03 PROCEDURE — 82306 VITAMIN D 25 HYDROXY: CPT

## 2024-06-03 PROCEDURE — 36415 COLL VENOUS BLD VENIPUNCTURE: CPT

## 2024-06-03 PROCEDURE — 85025 COMPLETE CBC W/AUTO DIFF WBC: CPT

## 2024-06-03 NOTE — PROGRESS NOTES
Date: 06/10/2024  Patient ID: [id number]   Chief Complaint: Follow-up (With labs)    HPI:   Rosalba Alejandro is a 23 y.o. female here today for Follow-up (With labs)  Labs WNL except for low vitamin-D. Otherwise denies acute complaints. She would like HepB panel titer since  has not started HepB tx yet    Patient Active Problem List   Diagnosis    Encounter for cervical Pap smear with pelvic exam    PCOS (polycystic ovarian syndrome)    Exposure to hepatitis B    Vitamin D deficiency    Leukocytosis     Outpatient Medications Marked as Taking for the 6/10/24 encounter (Office Visit) with Isabel Schrader MD   Medication Sig Dispense Refill    [DISCONTINUED] estradioL (ESTRACE) 1 MG tablet Take 1 tablet (1 mg total) by mouth once daily. 90 tablet 3     Past Medical History:   Diagnosis Date    Anemia, unspecified     Exposure to hepatitis B     IGT (impaired glucose tolerance) 12/07/2023    PCOS (polycystic ovarian syndrome)     9 mos on contraceptive, keeps regular    Recurrent UTI     Tension headache     Urine culture positive 12/07/2023     History reviewed. No pertinent surgical history.  Review of patient's allergies indicates:  No Known Allergies  Family History   Problem Relation Name Age of Onset    Hypertension Mother Mikayla Alejandro     Hypertension Father Jose Alejandro       Social History     Socioeconomic History    Marital status:    Occupational History    Occupation: nurse at [employer]   Tobacco Use    Smoking status: Never    Smokeless tobacco: Never   Substance and Sexual Activity    Alcohol use: Not Currently     Comment: occasional    Drug use: Never    Sexual activity: Yes     Partners: Male     Birth control/protection: OCP     Comment:    Social History Narrative    planning     Social Determinants of Health     Financial Resource Strain: Low Risk  (12/18/2023)    Overall Financial Resource Strain (CARDIA)     Difficulty of Paying Living Expenses: Not very hard   Food  "Insecurity: Food Insecurity Present (12/18/2023)    Hunger Vital Sign     Worried About Running Out of Food in the Last Year: Sometimes true     Ran Out of Food in the Last Year: Never true   Transportation Needs: No Transportation Needs (12/18/2023)    PRAPARE - Transportation     Lack of Transportation (Medical): No     Lack of Transportation (Non-Medical): No   Physical Activity: Inactive (12/18/2023)    Exercise Vital Sign     Days of Exercise per Week: 1 day     Minutes of Exercise per Session: 0 min   Stress: No Stress Concern Present (12/7/2023)    [facility] of Occupational Health - Occupational Stress Questionnaire     Feeling of Stress : Not at all   Housing Stability: Low Risk  (12/18/2023)    Housing Stability Vital Sign     Unable to Pay for Housing in the Last Year: No     Number of Places Lived in the Last Year: 1     Unstable Housing in the Last Year: No     Patient Care Team:  Isabel Schrader MD as PCP - General (Family Medicine)   Subjective:   ROS  See HPI for details  All Other ROS: Negative except as stated in HPI.   Objective:   /78   Pulse 81   Ht 4' 11" (1.499 m)   Wt 74.6 kg (164 lb 6.4 oz)   SpO2 96%   BMI 33.20 kg/m²   Physical Exam  General: NAD  Eye: EOMI  HENT: no nasal discharge  Respiratory: non-labored breathing  Musculoskeletal: ambulates independently. No obvious deformity  Integumentary: no apparent discoloration  Neurologic: Alert, oriented to person and situation  Cognition and Speech: Speech clear and coherent.   Psychiatric: Cooperative    Assessment:       ICD-10-CM ICD-9-CM   1. Vitamin D deficiency  E55.9 268.9   2. Leukocytosis, unspecified type  D72.829 288.60   3. Exposure to hepatitis B  Z20.5 V01.79   4. Class 1 obesity with body mass index (BMI) of 33.0 to 33.9 in adult, unspecified obesity type, unspecified whether serious comorbidity present  E66.9 278.00    Z68.33 V85.33      Plan:   1. Vitamin D deficiency  Assessment & Plan:  Vitamin D 50,000u " po qwk x 12wks  Recheck level after completed supplementation      Orders:  -     cholecalciferol, vitamin D3, 1,250 mcg (50,000 unit) capsule; Take 1 capsule (50,000 Units total) by mouth every 7 days. for 12 doses  Dispense: 12 capsule; Refill: 0  -     Vitamin D; Future; Expected date: 12/10/2024    2. Leukocytosis, unspecified type  Assessment & Plan:  Improving   Recheck at routine interval  Consider further investigation if still abnormal    Orders:  -     CBC Auto Differential; Future; Expected date: 12/10/2024    3. Exposure to hepatitis B  Overview:  Obtain hepatitis panel    Orders:  -     Hepatitis B Surface Antibody, Qual/Quant; Future; Expected date: 06/10/2024    4. Class 1 obesity with body mass index (BMI) of 33.0 to 33.9 in adult, unspecified obesity type, unspecified whether serious comorbidity present  -     CBC Auto Differential; Future; Expected date: 12/10/2024  -     Comprehensive Metabolic Panel; Future; Expected date: 12/10/2024  -     Lipid Panel; Future; Expected date: 12/10/2024  -     Hemoglobin A1C; Future; Expected date: 12/10/2024  -     Vitamin D; Future; Expected date: 12/10/2024         Medication List with Changes/Refills   New Medications    CHOLECALCIFEROL, VITAMIN D3, 1,250 MCG (50,000 UNIT) CAPSULE    Take 1 capsule (50,000 Units total) by mouth every 7 days. for 12 doses       Start Date: 6/10/2024 End Date: 8/27/2024   Current Medications    NORETHINDRONE-ETHINYL ESTRADIOL (JUNEL FE 1/20) 1 MG-20 MCG (21)/75 MG (7) PER TABLET    Take 1 tablet by mouth once daily.       Start Date: --        End Date: --   Discontinued Medications    ESTRADIOL (ESTRACE) 1 MG TABLET    Take 1 tablet (1 mg total) by mouth once daily.       Start Date: 12/18/2023End Date: 6/10/2024        Follow up in about 6 months (around 12/10/2024) for Wellness. In addition to their scheduled follow up, the patient has also been instructed to follow up on as needed basis.

## 2024-06-10 ENCOUNTER — OFFICE VISIT (OUTPATIENT)
Dept: PRIMARY CARE CLINIC | Facility: CLINIC | Age: 24
End: 2024-06-10
Payer: COMMERCIAL

## 2024-06-10 VITALS
DIASTOLIC BLOOD PRESSURE: 78 MMHG | WEIGHT: 164.38 LBS | HEIGHT: 59 IN | HEART RATE: 81 BPM | SYSTOLIC BLOOD PRESSURE: 116 MMHG | OXYGEN SATURATION: 96 % | BODY MASS INDEX: 33.14 KG/M2

## 2024-06-10 DIAGNOSIS — E55.9 VITAMIN D DEFICIENCY: Primary | ICD-10-CM

## 2024-06-10 DIAGNOSIS — Z20.5 EXPOSURE TO HEPATITIS B: ICD-10-CM

## 2024-06-10 DIAGNOSIS — D72.829 LEUKOCYTOSIS, UNSPECIFIED TYPE: ICD-10-CM

## 2024-06-10 DIAGNOSIS — E66.9 CLASS 1 OBESITY WITH BODY MASS INDEX (BMI) OF 33.0 TO 33.9 IN ADULT, UNSPECIFIED OBESITY TYPE, UNSPECIFIED WHETHER SERIOUS COMORBIDITY PRESENT: ICD-10-CM

## 2024-06-10 PROBLEM — R73.02 IGT (IMPAIRED GLUCOSE TOLERANCE): Status: RESOLVED | Noted: 2023-12-07 | Resolved: 2024-06-10

## 2024-06-10 PROBLEM — R82.79 URINE CULTURE POSITIVE: Status: RESOLVED | Noted: 2023-12-07 | Resolved: 2024-06-10

## 2024-06-10 PROCEDURE — 3074F SYST BP LT 130 MM HG: CPT | Mod: CPTII,,, | Performed by: STUDENT IN AN ORGANIZED HEALTH CARE EDUCATION/TRAINING PROGRAM

## 2024-06-10 PROCEDURE — 1159F MED LIST DOCD IN RCRD: CPT | Mod: CPTII,,, | Performed by: STUDENT IN AN ORGANIZED HEALTH CARE EDUCATION/TRAINING PROGRAM

## 2024-06-10 PROCEDURE — 3044F HG A1C LEVEL LT 7.0%: CPT | Mod: CPTII,,, | Performed by: STUDENT IN AN ORGANIZED HEALTH CARE EDUCATION/TRAINING PROGRAM

## 2024-06-10 PROCEDURE — 1160F RVW MEDS BY RX/DR IN RCRD: CPT | Mod: CPTII,,, | Performed by: STUDENT IN AN ORGANIZED HEALTH CARE EDUCATION/TRAINING PROGRAM

## 2024-06-10 PROCEDURE — 3008F BODY MASS INDEX DOCD: CPT | Mod: CPTII,,, | Performed by: STUDENT IN AN ORGANIZED HEALTH CARE EDUCATION/TRAINING PROGRAM

## 2024-06-10 PROCEDURE — 3078F DIAST BP <80 MM HG: CPT | Mod: CPTII,,, | Performed by: STUDENT IN AN ORGANIZED HEALTH CARE EDUCATION/TRAINING PROGRAM

## 2024-06-10 PROCEDURE — 99213 OFFICE O/P EST LOW 20 MIN: CPT | Mod: ,,, | Performed by: STUDENT IN AN ORGANIZED HEALTH CARE EDUCATION/TRAINING PROGRAM

## 2024-06-10 RX ORDER — ASPIRIN 325 MG
50000 TABLET, DELAYED RELEASE (ENTERIC COATED) ORAL
Qty: 12 CAPSULE | Refills: 0 | Status: SHIPPED | OUTPATIENT
Start: 2024-06-10 | End: 2024-08-27

## 2024-06-10 RX ORDER — NORETHINDRONE ACETATE AND ETHINYL ESTRADIOL 1MG-20(21)
1 KIT ORAL DAILY
COMMUNITY

## 2024-08-08 ENCOUNTER — OFFICE VISIT (OUTPATIENT)
Dept: URGENT CARE | Facility: CLINIC | Age: 24
End: 2024-08-08
Payer: COMMERCIAL

## 2024-08-08 VITALS
RESPIRATION RATE: 18 BRPM | HEART RATE: 97 BPM | HEIGHT: 59 IN | SYSTOLIC BLOOD PRESSURE: 122 MMHG | TEMPERATURE: 99 F | BODY MASS INDEX: 33.06 KG/M2 | DIASTOLIC BLOOD PRESSURE: 88 MMHG | WEIGHT: 164 LBS | OXYGEN SATURATION: 99 %

## 2024-08-08 DIAGNOSIS — R50.9 FEVER, UNSPECIFIED FEVER CAUSE: ICD-10-CM

## 2024-08-08 DIAGNOSIS — N30.01 ACUTE CYSTITIS WITH HEMATURIA: Primary | ICD-10-CM

## 2024-08-08 LAB
ALBUMIN SERPL-MCNC: 3.2 G/DL (ref 3.5–5)
ALBUMIN/GLOB SERPL: 0.7 RATIO (ref 1.1–2)
ALP SERPL-CCNC: 66 UNIT/L (ref 40–150)
ALT SERPL-CCNC: 17 UNIT/L (ref 0–55)
ANION GAP SERPL CALC-SCNC: 7 MEQ/L
AST SERPL-CCNC: 16 UNIT/L (ref 5–34)
B-HCG UR QL: NEGATIVE
BILIRUB SERPL-MCNC: 0.3 MG/DL
BILIRUB UR QL STRIP: POSITIVE
BUN SERPL-MCNC: 10.5 MG/DL (ref 7–18.7)
CALCIUM SERPL-MCNC: 9.9 MG/DL (ref 8.4–10.2)
CHLORIDE SERPL-SCNC: 103 MMOL/L (ref 98–107)
CO2 SERPL-SCNC: 23 MMOL/L (ref 22–29)
CREAT SERPL-MCNC: 0.75 MG/DL (ref 0.55–1.02)
CREAT/UREA NIT SERPL: 14
CTP QC/QA: YES
CTP QC/QA: YES
GFR SERPLBLD CREATININE-BSD FMLA CKD-EPI: >60 ML/MIN/1.73/M2
GLOBULIN SER-MCNC: 4.4 GM/DL (ref 2.4–3.5)
GLUCOSE SERPL-MCNC: 128 MG/DL (ref 74–100)
GLUCOSE UR QL STRIP: NEGATIVE
KETONES UR QL STRIP: NEGATIVE
LEUKOCYTE ESTERASE UR QL STRIP: POSITIVE
PH, POC UA: 5
POC BLOOD, URINE: POSITIVE
POC NITRATES, URINE: POSITIVE
POTASSIUM SERPL-SCNC: 3.9 MMOL/L (ref 3.5–5.1)
PROT SERPL-MCNC: 7.6 GM/DL (ref 6.4–8.3)
PROT UR QL STRIP: POSITIVE
SARS-COV-2 AG RESP QL IA.RAPID: NEGATIVE
SODIUM SERPL-SCNC: 133 MMOL/L (ref 136–145)
SP GR UR STRIP: 1.02 (ref 1–1.03)
UROBILINOGEN UR STRIP-ACNC: 8 (ref 0.1–1.1)

## 2024-08-08 PROCEDURE — 80053 COMPREHEN METABOLIC PANEL: CPT | Performed by: FAMILY MEDICINE

## 2024-08-08 PROCEDURE — 36415 COLL VENOUS BLD VENIPUNCTURE: CPT | Performed by: FAMILY MEDICINE

## 2024-08-08 PROCEDURE — 87086 URINE CULTURE/COLONY COUNT: CPT | Performed by: FAMILY MEDICINE

## 2024-08-08 RX ORDER — CEPHALEXIN 500 MG/1
500 CAPSULE ORAL EVERY 8 HOURS
Qty: 21 CAPSULE | Refills: 0 | Status: SHIPPED | OUTPATIENT
Start: 2024-08-08 | End: 2024-08-15

## 2024-08-10 LAB — BACTERIA UR CULT: NO GROWTH

## 2024-12-24 DIAGNOSIS — E28.2 PCOS (POLYCYSTIC OVARIAN SYNDROME): Primary | ICD-10-CM

## 2024-12-24 RX ORDER — ESTRADIOL 1 MG/1
1 TABLET ORAL DAILY
Qty: 90 TABLET | Refills: 2 | Status: SHIPPED | OUTPATIENT
Start: 2024-12-24

## 2024-12-24 RX ORDER — NORETHINDRONE ACETATE AND ETHINYL ESTRADIOL 1MG-20(21)
1 KIT ORAL DAILY
Qty: 30 TABLET | Refills: 2 | Status: SHIPPED | OUTPATIENT
Start: 2024-12-24

## 2024-12-24 RX ORDER — ESTRADIOL 1 MG/1
1 TABLET ORAL
COMMUNITY
Start: 2024-09-13 | End: 2024-12-24 | Stop reason: SDUPTHER

## 2025-01-21 ENCOUNTER — OFFICE VISIT (OUTPATIENT)
Dept: PRIMARY CARE CLINIC | Facility: CLINIC | Age: 25
End: 2025-01-21
Payer: COMMERCIAL

## 2025-01-21 DIAGNOSIS — E28.2 PCOS (POLYCYSTIC OVARIAN SYNDROME): Primary | ICD-10-CM

## 2025-01-21 PROCEDURE — 1160F RVW MEDS BY RX/DR IN RCRD: CPT | Mod: CPTII,95,, | Performed by: NURSE PRACTITIONER

## 2025-01-21 PROCEDURE — 1159F MED LIST DOCD IN RCRD: CPT | Mod: CPTII,95,, | Performed by: NURSE PRACTITIONER

## 2025-01-21 PROCEDURE — 98006 SYNCH AUDIO-VIDEO EST MOD 30: CPT | Mod: 95,,, | Performed by: NURSE PRACTITIONER

## 2025-01-21 NOTE — PROGRESS NOTES
Internal Medicine      Patient ID: [id number]     Chief Complaint: OCP change      HPI:     This is a telemedicine note. Patient was treated using telemedicine, real time audio and video, according to [facility] protocols. IDuarte, conducted the visit from the [facility]. The patient participated in the visit at a non-OL location selected by the patient, identified below. I am licensed in the state where the patient stated they are located. The patient stated that they understood and accepted the privacy and security risks to their information at their location. This visit is not recorded.    Patient was located at the patient's home.      274}    Rosalba Alejandro is a 24 y.o. female here today for a telemedicine visit. Reports daily spotting on OCP for the last couple of months. Took home UPT, which was negative. Denies heavy bleeding, weakness, fatigue.     Past Medical History:   Diagnosis Date    Anemia, unspecified     Exposure to hepatitis B     IGT (impaired glucose tolerance) 12/07/2023    PCOS (polycystic ovarian syndrome)     9 mos on contraceptive, keeps regular    Recurrent UTI     Tension headache     Urine culture positive 12/07/2023        History reviewed. No pertinent surgical history.     Social History     Tobacco Use    Smoking status: Never    Smokeless tobacco: Never   Substance and Sexual Activity    Alcohol use: Not Currently     Comment: occasional    Drug use: Never    Sexual activity: Yes     Partners: Male     Birth control/protection: OCP     Comment:         Current Outpatient Medications   Medication Instructions    estradioL (ESTRACE) 1 mg, Oral, Daily    norethindrone-ethinyl estradiol (JUNEL FE 1/20) 1 mg-20 mcg (21)/75 mg (7) per tablet 1 tablet, Oral, Daily       Review of patient's allergies indicates:  No Known Allergies     Patient Care Team:  Isabel Schrader MD as PCP - General (Family Medicine)     Subjective:     Review of Systems    Constitutional:  Negative for activity change and unexpected weight change.   HENT:  Negative for hearing loss, rhinorrhea and trouble swallowing.    Eyes:  Negative for discharge and visual disturbance.   Respiratory:  Negative for chest tightness and wheezing.    Cardiovascular:  Negative for chest pain and palpitations.   Gastrointestinal:  Negative for blood in stool, constipation, diarrhea and vomiting.   Endocrine: Negative for polydipsia and polyuria.   Genitourinary:  Positive for menstrual problem. Negative for difficulty urinating, dysuria and hematuria.   Musculoskeletal:  Negative for arthralgias, joint swelling and neck pain.   Neurological:  Negative for weakness and headaches.   Psychiatric/Behavioral:  Negative for confusion and dysphoric mood.        12 point review of systems conducted, negative except as stated in the history of present illness. See HPI for details.    Objective:     There were no vitals taken for this visit.    Physical Exam    Physical Exam: LIMITED DUE TO TELEMEDICINE RESTRICTIONS.  General: Alert and oriented, No acute distress.  Head: Normocephalic.  Eye: Sclera non-icteric.  Neck/Thyroid:  Full range of motion.  Respiratory: Non-labored respirations, Symmetrical chest wall expansion.  Musculoskeletal: Normal range of motion.  Integumentary:  No visible suspicious lesions or rashes. No diaphoresis.   Neurologic: No focal deficits  Psychiatric: Normal interaction, Coherent speech, Euthymic mood, Appropriate affect     Assessment:       ICD-10-CM ICD-9-CM   1. PCOS (polycystic ovarian syndrome)  E28.2 256.4        Plan:     1. PCOS (polycystic ovarian syndrome)  Assessment & Plan:  Will need further evaluation by GYN with US + pelvic + Pap  Change in current recommended but will defer to GYN    Orders:  -     Ambulatory referral/consult to Obstetrics / Gynecology; Future; Expected date: 01/28/2025      In addition to their scheduled follow up, the patient has also been  instructed to follow up on as needed basis.     No future appointments.     Video Time Documentation:  Spent 10 minutes with patient face to face discussed health concerns. More than 50% of this time was spent in counseling and coordination of care.    LAKEISHA Dunham

## 2025-01-30 ENCOUNTER — PATIENT MESSAGE (OUTPATIENT)
Dept: PRIMARY CARE CLINIC | Facility: CLINIC | Age: 25
End: 2025-01-30
Payer: COMMERCIAL

## 2025-02-04 NOTE — ASSESSMENT & PLAN NOTE
Will need further evaluation by GYN with US + pelvic + Pap  Change in current recommended but will defer to GYN

## 2025-04-21 DIAGNOSIS — E55.9 VITAMIN D DEFICIENCY: ICD-10-CM

## 2025-04-21 DIAGNOSIS — Z20.5 EXPOSURE TO HEPATITIS B: ICD-10-CM

## 2025-04-21 DIAGNOSIS — E66.811 CLASS 1 OBESITY WITH BODY MASS INDEX (BMI) OF 33.0 TO 33.9 IN ADULT, UNSPECIFIED OBESITY TYPE, UNSPECIFIED WHETHER SERIOUS COMORBIDITY PRESENT: ICD-10-CM

## 2025-04-21 DIAGNOSIS — D72.829 LEUKOCYTOSIS, UNSPECIFIED TYPE: ICD-10-CM

## 2025-04-23 ENCOUNTER — OFFICE VISIT (OUTPATIENT)
Dept: URGENT CARE | Facility: CLINIC | Age: 25
End: 2025-04-23
Payer: COMMERCIAL

## 2025-04-23 VITALS
SYSTOLIC BLOOD PRESSURE: 115 MMHG | TEMPERATURE: 102 F | HEIGHT: 59 IN | OXYGEN SATURATION: 99 % | WEIGHT: 165.38 LBS | BODY MASS INDEX: 33.34 KG/M2 | RESPIRATION RATE: 18 BRPM | DIASTOLIC BLOOD PRESSURE: 75 MMHG | HEART RATE: 114 BPM

## 2025-04-23 DIAGNOSIS — R50.9 FEVER, UNSPECIFIED FEVER CAUSE: ICD-10-CM

## 2025-04-23 DIAGNOSIS — R52 BODY ACHES: ICD-10-CM

## 2025-04-23 DIAGNOSIS — R10.9 FLANK PAIN: ICD-10-CM

## 2025-04-23 DIAGNOSIS — N39.0 COMPLICATED UTI (URINARY TRACT INFECTION): Primary | ICD-10-CM

## 2025-04-23 DIAGNOSIS — N12 PYELONEPHRITIS: ICD-10-CM

## 2025-04-23 LAB
BILIRUB UR QL STRIP: NEGATIVE
CTP QC/QA: YES
CTP QC/QA: YES
GLUCOSE UR QL STRIP: NEGATIVE
KETONES UR QL STRIP: NEGATIVE
LEUKOCYTE ESTERASE UR QL STRIP: POSITIVE
PH, POC UA: 6
POC BLOOD, URINE: POSITIVE
POC MOLECULAR INFLUENZA A AGN: NEGATIVE
POC MOLECULAR INFLUENZA B AGN: NEGATIVE
POC NITRATES, URINE: POSITIVE
PROT UR QL STRIP: POSITIVE
SARS CORONAVIRUS 2 ANTIGEN: NEGATIVE
SP GR UR STRIP: 1.01 (ref 1–1.03)
UROBILINOGEN UR STRIP-ACNC: NEGATIVE (ref 0.1–1.1)

## 2025-04-23 PROCEDURE — 87077 CULTURE AEROBIC IDENTIFY: CPT | Performed by: FAMILY MEDICINE

## 2025-04-23 RX ORDER — CEFTRIAXONE 1 G/1
1 INJECTION, POWDER, FOR SOLUTION INTRAMUSCULAR; INTRAVENOUS
Status: COMPLETED | OUTPATIENT
Start: 2025-04-23 | End: 2025-04-23

## 2025-04-23 RX ORDER — SULFAMETHOXAZOLE AND TRIMETHOPRIM 800; 160 MG/1; MG/1
1 TABLET ORAL 2 TIMES DAILY
Qty: 14 TABLET | Refills: 0 | Status: SHIPPED | OUTPATIENT
Start: 2025-04-23 | End: 2025-04-30

## 2025-04-23 RX ORDER — ACETAMINOPHEN 500 MG
1000 TABLET ORAL
Status: COMPLETED | OUTPATIENT
Start: 2025-04-23 | End: 2025-04-23

## 2025-04-23 RX ADMIN — Medication 1000 MG: at 09:04

## 2025-04-23 RX ADMIN — CEFTRIAXONE 1 G: 1 INJECTION, POWDER, FOR SOLUTION INTRAMUSCULAR; INTRAVENOUS at 10:04

## 2025-04-23 NOTE — PROGRESS NOTES
"Patient ID: Rosalba Alejandro is a 24 y.o. female.  Chief Complaint: Fever (On and off fevers and chills. Left flank pain and headache. - Entered by patient)    HPI:   Patient presents here today for above reason.     Patient is a 24 y.o. female who presents to urgent care with complaints of chills, low-grade fever (99 range), headache, left flank pain (sharp pains at times), body aches x Monday night. Alleviating factors include Tylenol with moderate amount of relief. Patient denies chest pain, congestion, cough, SOB, urinary symptoms (dysuria, hematuria, etc), or any other symptoms at this time. She reports a hx of frequent UTIs.    Past Medical History:  Past Medical History:   Diagnosis Date    Anemia, unspecified     Exposure to hepatitis B     IGT (impaired glucose tolerance) 12/07/2023    PCOS (polycystic ovarian syndrome)     9 mos on contraceptive, keeps regular    Recurrent UTI     Tension headache     Urine culture positive 12/07/2023     Past Surgical History:   Procedure Laterality Date    NO PAST SURGERIES       Review of patient's allergies indicates:  No Known Allergies  Current Outpatient Medications   Medication Instructions    estradioL (ESTRACE) 1 mg, Oral, Daily    norethindrone-ethinyl estradiol (JUNEL FE 1/20) 1 mg-20 mcg (21)/75 mg (7) per tablet 1 tablet, Oral, Daily    sulfamethoxazole-trimethoprim 800-160mg (BACTRIM DS) 800-160 mg Tab 1 tablet, Oral, 2 times daily     Social History[1]    ROS:   Review of Systems  12 point review of systems conducted, negative except as stated in the history of present illness. See HPI for details.   Vitals/PE:   Visit Vitals  /75   Pulse (!) 114   Temp (!) 102.3 °F (39.1 °C)   Resp 18   Ht 4' 11" (1.499 m)   Wt 75 kg (165 lb 5.5 oz)   LMP 04/12/2025 (Exact Date)   SpO2 99%   BMI 33.40 kg/m²     Physical Exam  Vitals and nursing note reviewed.   Constitutional:       General: She is not in acute distress.     Appearance: Normal appearance. She is " ill-appearing. She is not toxic-appearing or diaphoretic.   HENT:      Head: Normocephalic and atraumatic.      Right Ear: Tympanic membrane normal.      Left Ear: Tympanic membrane normal.      Mouth/Throat:      Mouth: Mucous membranes are dry.      Pharynx: Oropharynx is clear.   Eyes:      Extraocular Movements: Extraocular movements intact.      Conjunctiva/sclera: Conjunctivae normal.      Pupils: Pupils are equal, round, and reactive to light.   Neck:      Vascular: No carotid bruit.   Cardiovascular:      Rate and Rhythm: Normal rate and regular rhythm.      Pulses: Normal pulses.      Heart sounds: Normal heart sounds. No murmur heard.     No friction rub. No gallop.   Pulmonary:      Effort: Pulmonary effort is normal. No respiratory distress.      Breath sounds: No stridor. No wheezing or rhonchi.   Abdominal:      General: There is no distension.      Palpations: There is no mass.      Tenderness: There is no abdominal tenderness. There is no left CVA tenderness, guarding or rebound.      Hernia: No hernia is present.   Musculoskeletal:         General: No swelling or signs of injury. Normal range of motion.      Cervical back: Normal range of motion and neck supple. No rigidity or tenderness.      Right lower leg: No edema.      Left lower leg: No edema.   Lymphadenopathy:      Cervical: No cervical adenopathy.   Skin:     Capillary Refill: Capillary refill takes less than 2 seconds.      Coloration: Skin is not jaundiced.      Findings: No bruising, lesion or rash.   Neurological:      General: No focal deficit present.      Mental Status: She is alert and oriented to person, place, and time. Mental status is at baseline.      Cranial Nerves: No cranial nerve deficit.      Sensory: No sensory deficit.      Motor: No weakness.      Coordination: Coordination normal.      Gait: Gait normal.   Psychiatric:         Mood and Affect: Mood normal.         Behavior: Behavior normal.         Thought Content:  Thought content normal.         Judgment: Judgment normal.         Results for orders placed or performed in visit on 04/23/25   POCT Urinalysis, Dipstick, Automated, W/O Scope    Collection Time: 04/23/25  9:57 AM   Result Value Ref Range    POC Blood, Urine Positive (A) Negative    POC Bilirubin, Urine Negative Negative    POC Urobilinogen, Urine Negative 0.1 - 1.1    POC Ketones, Urine Negative Negative    POC Protein, Urine Positive (A) Negative    POC Nitrates, Urine Positive (A) Negative    POC Glucose, Urine Negative Negative    pH, UA 6     POC Specific Gravity, Urine 1.015 1.003 - 1.029    POC Leukocytes, Urine Positive (A) Negative   POCT Influenza A/B Molecular    Collection Time: 04/23/25 10:03 AM   Result Value Ref Range    POC Molecular Influenza A Ag Negative Negative    POC Molecular Influenza B Ag Negative Negative     Acceptable Yes    SARS Coronavirus 2 Antigen, POCT Manual Read    Collection Time: 04/23/25 10:10 AM   Result Value Ref Range    SARS Coronavirus 2 Antigen Negative Negative, Presumptive Negative     Acceptable Yes      Assessment/Plan:   Complicated UTI (urinary tract infection)  -     cefTRIAXone injection 1 g  -     sulfamethoxazole-trimethoprim 800-160mg (BACTRIM DS) 800-160 mg Tab; Take 1 tablet by mouth 2 (two) times daily. for 7 days  Dispense: 14 tablet; Refill: 0  -     Urine Culture High Risk  Treatment as above.  Recommend daily use of OTC probiotics while on prescription antibiotics.  Verbalized understanding.  Body aches  -     POCT Influenza A/B Molecular  -     SARS Coronavirus 2 Antigen, POCT Manual Read  Flu and COVID are both negative.  Flank pain  -     POCT Urinalysis, Dipstick, Automated, W/O Scope  Five hundred leukocytes +nitrates etc. fever present.  Fever, unspecified fever cause  -     acetaminophen tablet 1,000 mg  Continue to alternate Tylenol and Motrin as needed for fever greater than 100.4  Pyelonephritis  -     cefTRIAXone  injection 1 g  -     sulfamethoxazole-trimethoprim 800-160mg (BACTRIM DS) 800-160 mg Tab; Take 1 tablet by mouth 2 (two) times daily. for 7 days  Dispense: 14 tablet; Refill: 0  -     Urine Culture High Risk       Orders Placed This Encounter   Procedures    Urine Culture High Risk    POCT Influenza A/B Molecular    SARS Coronavirus 2 Antigen, POCT Manual Read    POCT Urinalysis, Dipstick, Automated, W/O Scope       Education and counseling done face to face regarding medical conditions and plan. Contact office if new symptoms develop. Should any symptoms ever significantly worsen seek emergency medical attention/go to ER. Follow up at least yearly for wellness or sooner PRN. Nurse to call patient with any results. The patient is receptive, expresses understanding and is agreeable to plan. All questions have been answered.             [1]   Social History  Socioeconomic History    Marital status:    Occupational History    Occupation: nurse at [employer]   Tobacco Use    Smoking status: Never    Smokeless tobacco: Never   Substance and Sexual Activity    Alcohol use: Yes     Comment: occasional    Drug use: Never    Sexual activity: Yes     Partners: Male     Birth control/protection: OCP     Comment:    Social History Narrative    planning     Social Drivers of Health     Financial Resource Strain: Low Risk  (1/21/2025)    Overall Financial Resource Strain (CARDIA)     Difficulty of Paying Living Expenses: Not very hard   Food Insecurity: No Food Insecurity (1/21/2025)    Hunger Vital Sign     Worried About Running Out of Food in the Last Year: Never true     Ran Out of Food in the Last Year: Never true   Transportation Needs: No Transportation Needs (12/18/2023)    PRAPARE - Transportation     Lack of Transportation (Medical): No     Lack of Transportation (Non-Medical): No   Physical Activity: Sufficiently Active (1/21/2025)    Exercise Vital Sign     Days of Exercise per Week: 3 days     Minutes of  Exercise per Session: 50 min   Stress: No Stress Concern Present (1/21/2025)    [place] of Occupational Health - Occupational Stress Questionnaire     Feeling of Stress : Only a little   Housing Stability: Low Risk  (12/18/2023)    Housing Stability Vital Sign     Unable to Pay for Housing in the Last Year: No     Number of Places Lived in the Last Year: 1     Unstable Housing in the Last Year: No

## 2025-04-23 NOTE — PATIENT INSTRUCTIONS
Assessment/Plan:   Complicated UTI (urinary tract infection)  -     cefTRIAXone injection 1 g  -     sulfamethoxazole-trimethoprim 800-160mg (BACTRIM DS) 800-160 mg Tab; Take 1 tablet by mouth 2 (two) times daily. for 7 days  Dispense: 14 tablet; Refill: 0  -     Urine Culture High Risk  Treatment as above.  Recommend daily use of OTC probiotics while on prescription antibiotics.  Verbalized understanding.  Body aches  -     POCT Influenza A/B Molecular  -     SARS Coronavirus 2 Antigen, POCT Manual Read  Flu and COVID are both negative.  Flank pain  -     POCT Urinalysis, Dipstick, Automated, W/O Scope  Five hundred leukocytes +nitrates etc. fever present.  Fever, unspecified fever cause  -     acetaminophen tablet 1,000 mg  Continue to alternate Tylenol and Motrin as needed for fever greater than 100.4  Pyelonephritis  -     cefTRIAXone injection 1 g  -     sulfamethoxazole-trimethoprim 800-160mg (BACTRIM DS) 800-160 mg Tab; Take 1 tablet by mouth 2 (two) times daily. for 7 days  Dispense: 14 tablet; Refill: 0  -     Urine Culture High Risk       Orders Placed This Encounter   Procedures    Urine Culture High Risk    POCT Influenza A/B Molecular    SARS Coronavirus 2 Antigen, POCT Manual Read    POCT Urinalysis, Dipstick, Automated, W/O Scope       Education and counseling done face to face regarding medical conditions and plan. Contact office if new symptoms develop. Should any symptoms ever significantly worsen seek emergency medical attention/go to ER.

## 2025-04-24 ENCOUNTER — RESULTS FOLLOW-UP (OUTPATIENT)
Dept: URGENT CARE | Facility: CLINIC | Age: 25
End: 2025-04-24

## 2025-04-25 ENCOUNTER — HOSPITAL ENCOUNTER (EMERGENCY)
Facility: HOSPITAL | Age: 25
Discharge: HOME | End: 2025-04-25
Attending: EMERGENCY MEDICINE
Payer: COMMERCIAL

## 2025-04-25 VITALS
HEIGHT: 59 IN | BODY MASS INDEX: 33.78 KG/M2 | OXYGEN SATURATION: 97 % | DIASTOLIC BLOOD PRESSURE: 73 MMHG | HEART RATE: 86 BPM | TEMPERATURE: 98 F | RESPIRATION RATE: 18 BRPM | WEIGHT: 167.56 LBS | SYSTOLIC BLOOD PRESSURE: 105 MMHG

## 2025-04-25 DIAGNOSIS — D72.829 LEUKOCYTOSIS, UNSPECIFIED TYPE: ICD-10-CM

## 2025-04-25 DIAGNOSIS — N12 PYELONEPHRITIS OF LEFT KIDNEY: Primary | ICD-10-CM

## 2025-04-25 LAB
ALBUMIN SERPL-MCNC: 3.2 G/DL (ref 3.5–5)
ALBUMIN/GLOB SERPL: 0.6 RATIO (ref 1.1–2)
ALP SERPL-CCNC: 68 UNIT/L (ref 40–150)
ALT SERPL-CCNC: 20 UNIT/L (ref 0–55)
ANION GAP SERPL CALC-SCNC: 10 MEQ/L
AST SERPL-CCNC: 14 UNIT/L (ref 11–45)
B-HCG UR QL: NEGATIVE
BACTERIA #/AREA URNS AUTO: ABNORMAL /HPF
BACTERIA UR CULT: ABNORMAL
BASOPHILS # BLD AUTO: 0.04 X10(3)/MCL
BASOPHILS NFR BLD AUTO: 0.2 %
BILIRUB SERPL-MCNC: 0.3 MG/DL
BILIRUB UR QL STRIP.AUTO: NEGATIVE
BUN SERPL-MCNC: 7.3 MG/DL (ref 7–18.7)
CALCIUM SERPL-MCNC: 9.5 MG/DL (ref 8.4–10.2)
CHLORIDE SERPL-SCNC: 102 MMOL/L (ref 98–107)
CLARITY UR: CLEAR
CO2 SERPL-SCNC: 22 MMOL/L (ref 22–29)
COLOR UR AUTO: COLORLESS
CREAT SERPL-MCNC: 0.72 MG/DL (ref 0.55–1.02)
CREAT/UREA NIT SERPL: 10
EOSINOPHIL # BLD AUTO: 0.04 X10(3)/MCL (ref 0–0.9)
EOSINOPHIL NFR BLD AUTO: 0.2 %
ERYTHROCYTE [DISTWIDTH] IN BLOOD BY AUTOMATED COUNT: 12.2 % (ref 11.5–17)
GFR SERPLBLD CREATININE-BSD FMLA CKD-EPI: >60 ML/MIN/1.73/M2
GLOBULIN SER-MCNC: 5.4 GM/DL (ref 2.4–3.5)
GLUCOSE SERPL-MCNC: 137 MG/DL (ref 74–100)
GLUCOSE UR QL STRIP: NORMAL
HCT VFR BLD AUTO: 44.1 % (ref 37–47)
HGB BLD-MCNC: 14.4 G/DL (ref 12–16)
HGB UR QL STRIP: ABNORMAL
IMM GRANULOCYTES # BLD AUTO: 0.07 X10(3)/MCL (ref 0–0.04)
IMM GRANULOCYTES NFR BLD AUTO: 0.4 %
KETONES UR QL STRIP: NEGATIVE
LACTATE SERPL-SCNC: 0.9 MMOL/L (ref 0.5–2.2)
LEUKOCYTE ESTERASE UR QL STRIP: 75
LYMPHOCYTES # BLD AUTO: 1.89 X10(3)/MCL (ref 0.6–4.6)
LYMPHOCYTES NFR BLD AUTO: 10.6 %
MCH RBC QN AUTO: 26.5 PG (ref 27–31)
MCHC RBC AUTO-ENTMCNC: 32.7 G/DL (ref 33–36)
MCV RBC AUTO: 81.1 FL (ref 80–94)
MONOCYTES # BLD AUTO: 1.02 X10(3)/MCL (ref 0.1–1.3)
MONOCYTES NFR BLD AUTO: 5.7 %
MUCOUS THREADS URNS QL MICRO: ABNORMAL /LPF
NEUTROPHILS # BLD AUTO: 14.82 X10(3)/MCL (ref 2.1–9.2)
NEUTROPHILS NFR BLD AUTO: 82.9 %
NITRITE UR QL STRIP: NEGATIVE
NRBC BLD AUTO-RTO: 0 %
PH UR STRIP: 7 [PH]
PLATELET # BLD AUTO: 295 X10(3)/MCL (ref 130–400)
PMV BLD AUTO: 10.1 FL (ref 7.4–10.4)
POTASSIUM SERPL-SCNC: 3.8 MMOL/L (ref 3.5–5.1)
PROT SERPL-MCNC: 8.6 GM/DL (ref 6.4–8.3)
PROT UR QL STRIP: ABNORMAL
RBC # BLD AUTO: 5.44 X10(6)/MCL (ref 4.2–5.4)
RBC #/AREA URNS AUTO: ABNORMAL /HPF
SODIUM SERPL-SCNC: 134 MMOL/L (ref 136–145)
SP GR UR STRIP.AUTO: 1 (ref 1–1.03)
SQUAMOUS #/AREA URNS LPF: ABNORMAL /HPF
UROBILINOGEN UR STRIP-ACNC: NORMAL
WBC # BLD AUTO: 17.88 X10(3)/MCL (ref 4.5–11.5)
WBC #/AREA URNS AUTO: ABNORMAL /HPF

## 2025-04-25 PROCEDURE — 87040 BLOOD CULTURE FOR BACTERIA: CPT

## 2025-04-25 PROCEDURE — 63600175 PHARM REV CODE 636 W HCPCS

## 2025-04-25 PROCEDURE — 85025 COMPLETE CBC W/AUTO DIFF WBC: CPT

## 2025-04-25 PROCEDURE — 81025 URINE PREGNANCY TEST: CPT

## 2025-04-25 PROCEDURE — 25000003 PHARM REV CODE 250

## 2025-04-25 PROCEDURE — 80053 COMPREHEN METABOLIC PANEL: CPT

## 2025-04-25 PROCEDURE — 96375 TX/PRO/DX INJ NEW DRUG ADDON: CPT

## 2025-04-25 PROCEDURE — 96374 THER/PROPH/DIAG INJ IV PUSH: CPT

## 2025-04-25 PROCEDURE — 83605 ASSAY OF LACTIC ACID: CPT | Performed by: EMERGENCY MEDICINE

## 2025-04-25 PROCEDURE — 25000003 PHARM REV CODE 250: Performed by: EMERGENCY MEDICINE

## 2025-04-25 PROCEDURE — 81001 URINALYSIS AUTO W/SCOPE: CPT

## 2025-04-25 PROCEDURE — 99285 EMERGENCY DEPT VISIT HI MDM: CPT | Mod: 25

## 2025-04-25 RX ORDER — CEFTRIAXONE 1 G/1
1 INJECTION, POWDER, FOR SOLUTION INTRAMUSCULAR; INTRAVENOUS
Status: COMPLETED | OUTPATIENT
Start: 2025-04-25 | End: 2025-04-25

## 2025-04-25 RX ORDER — KETOROLAC TROMETHAMINE 30 MG/ML
15 INJECTION, SOLUTION INTRAMUSCULAR; INTRAVENOUS
Status: DISCONTINUED | OUTPATIENT
Start: 2025-04-25 | End: 2025-04-25

## 2025-04-25 RX ORDER — ACETAMINOPHEN 500 MG
1000 TABLET ORAL
Status: COMPLETED | OUTPATIENT
Start: 2025-04-25 | End: 2025-04-25

## 2025-04-25 RX ORDER — ONDANSETRON HYDROCHLORIDE 2 MG/ML
4 INJECTION, SOLUTION INTRAVENOUS
Status: DISCONTINUED | OUTPATIENT
Start: 2025-04-25 | End: 2025-04-25

## 2025-04-25 RX ORDER — SULFAMETHOXAZOLE AND TRIMETHOPRIM 800; 160 MG/1; MG/1
1 TABLET ORAL 2 TIMES DAILY
Qty: 14 TABLET | Refills: 0 | Status: SHIPPED | OUTPATIENT
Start: 2025-04-25 | End: 2025-05-02

## 2025-04-25 RX ORDER — ONDANSETRON 4 MG/1
4 TABLET, ORALLY DISINTEGRATING ORAL EVERY 6 HOURS PRN
Qty: 20 TABLET | Refills: 0 | Status: SHIPPED | OUTPATIENT
Start: 2025-04-25

## 2025-04-25 RX ORDER — ONDANSETRON HYDROCHLORIDE 2 MG/ML
4 INJECTION, SOLUTION INTRAVENOUS
Status: COMPLETED | OUTPATIENT
Start: 2025-04-25 | End: 2025-04-25

## 2025-04-25 RX ADMIN — CEFTRIAXONE 1 G: 1 INJECTION, POWDER, FOR SOLUTION INTRAMUSCULAR; INTRAVENOUS at 11:04

## 2025-04-25 RX ADMIN — ACETAMINOPHEN 1000 MG: 500 TABLET ORAL at 11:04

## 2025-04-25 RX ADMIN — SODIUM CHLORIDE 1000 ML: 9 INJECTION, SOLUTION INTRAVENOUS at 09:04

## 2025-04-25 RX ADMIN — ONDANSETRON 4 MG: 2 INJECTION INTRAMUSCULAR; INTRAVENOUS at 09:04

## 2025-04-25 NOTE — FIRST PROVIDER EVALUATION
"Medical screening examination initiated.  I have conducted a focused provider triage encounter, findings are as follows:    Brief history of present illness:  24 year old female presents to ER with fever, chills, and flank pain x 4 days with N/V.  States she was seen at urgent care and diagnosed with bladder infection. She was given rocephin and started on Bactrim.     Vitals:    04/25/25 0910   BP: 121/78   Pulse: 102   Resp: 16   Temp: 99.2 °F (37.3 °C)   TempSrc: Oral   SpO2: 97%   Weight: 76 kg (167 lb 8.8 oz)   Height: 4' 11" (1.499 m)       Pertinent physical exam:  awake and alert, nad    Brief workup plan:  labs, ua    Preliminary workup initiated; this workup will be continued and followed by the physician or advanced practice provider that is assigned to the patient when roomed.  "

## 2025-04-25 NOTE — ED PROVIDER NOTES
Encounter Date: 4/25/2025       History     Chief Complaint   Patient presents with    Fever     Patient reports fever, chills, flank pain, nausea, vomiting x 4 days. Seen at  on Wednesday diagnosed with UTI and started on antibiotics. Reports symptoms have not resolved.      HPI  24 year old female with PMH of PCOS who presented to the ED with complaint of fever, chills, left sided flank pain, N/V x 4 days. She was seen an Urgent care in [place] with urine cx growing >/= 100,000 colonies/ml E coli and was sent home on Bactrim. She reports taking abx but not noticing any improvement in her symptoms. LMP 4/12/2025.      Review of patient's allergies indicates:  No Known Allergies  Past Medical History:   Diagnosis Date    Anemia, unspecified     Exposure to hepatitis B     IGT (impaired glucose tolerance) 12/07/2023    PCOS (polycystic ovarian syndrome)     9 mos on contraceptive, keeps regular    Recurrent UTI     Tension headache     Urine culture positive 12/07/2023     Past Surgical History:   Procedure Laterality Date    NO PAST SURGERIES       Family History   Problem Relation Name Age of Onset    Hypertension [facility]     Hypertension Father Jose Alejandro      Social History[1]  Review of Systems   Constitutional:  Positive for chills and fever.   Gastrointestinal:  Positive for nausea and vomiting. Negative for constipation and diarrhea.   Genitourinary:  Positive for dysuria and flank pain. Negative for pelvic pain and urgency.       Physical Exam     Initial Vitals [04/25/25 0910]   BP Pulse Resp Temp SpO2   121/78 102 16 99.2 °F (37.3 °C) 97 %      MAP       --         Physical Exam    Nursing note and vitals reviewed.  Constitutional: She appears well-developed and well-nourished. She is not diaphoretic. No distress.   Cardiovascular:  Normal rate, regular rhythm and normal heart sounds.     Exam reveals no friction rub.       No murmur heard.  Pulmonary/Chest: Breath sounds  normal. No respiratory distress. She has no wheezes. She has no rhonchi. She has no rales.   Abdominal: Abdomen is soft. Bowel sounds are normal. She exhibits no distension. There is no abdominal tenderness.   No right CVA tenderness.  There is left CVA tenderness. There is no rebound, no guarding and negative Nicholson's sign.   Musculoskeletal:         General: Normal range of motion.     Neurological: She is alert and oriented to person, place, and time. GCS score is 15. GCS eye subscore is 4. GCS verbal subscore is 5. GCS motor subscore is 6.         ED Course   Procedures  Labs Reviewed   COMPREHENSIVE METABOLIC PANEL - Abnormal       Result Value    Sodium 134 (*)     Potassium 3.8      Chloride 102      CO2 22      Glucose 137 (*)     Blood Urea Nitrogen 7.3      Creatinine 0.72      Calcium 9.5      Protein Total 8.6 (*)     Albumin 3.2 (*)     Globulin 5.4 (*)     Albumin/Globulin Ratio 0.6 (*)     Bilirubin Total 0.3      ALP 68      ALT 20      AST 14      eGFR >60      Anion Gap 10.0      BUN/Creatinine Ratio 10     URINALYSIS, REFLEX TO URINE CULTURE - Abnormal    Color, UA Colorless      Appearance, UA Clear      Specific Gravity, UA 1.005      pH, UA 7.0      Protein, UA Trace (*)     Glucose, UA Normal      Ketones, UA Negative      Blood, UA 1+ (*)     Bilirubin, UA Negative      Urobilinogen, UA Normal      Nitrites, UA Negative      Leukocyte Esterase, UA 75 (*)     RBC, UA 0-5      WBC, UA 6-10 (*)     Bacteria, UA Trace      Squamous Epithelial Cells, UA Occasional (*)     Mucous, UA Trace (*)    CBC WITH DIFFERENTIAL - Abnormal    WBC 17.88 (*)     RBC 5.44 (*)     Hgb 14.4      Hct 44.1      MCV 81.1      MCH 26.5 (*)     MCHC 32.7 (*)     RDW 12.2      Platelet 295      MPV 10.1      Neut % 82.9      Lymph % 10.6      Mono % 5.7      Eos % 0.2      Basophil % 0.2      Imm Grans % 0.4      Neut # 14.82 (*)     Lymph # 1.89      Mono # 1.02      Eos # 0.04      Baso # 0.04      Imm Gran # 0.07 (*)      NRBC% 0.0     PREGNANCY TEST, URINE RAPID - Normal    hCG Qualitative, Urine Negative     LACTIC ACID, PLASMA - Normal    Lactic Acid Level 0.9     BLOOD CULTURE [facility]   BLOOD CULTURE [facility]   CBC W/ AUTO DIFFERENTIAL    Narrative:     The following orders were created for panel order CBC auto differential.  Procedure                               Abnormality         Status                     ---------                               -----------         ------                     CBC with Differential[[id number]]       Abnormal            Final result                 Please view results for these tests on the individual orders.          Imaging Results              CT Renal Stone Study ABD Pelvis WO (Final result)  Result time 04/25/25 11:17:58      Final result by Humera Curtis MD (04/25/25 11:17:58)                   Impression:      1. No obstructing urinary calculus or hydronephrosis.  2. Mild left perinephric stranding, may be infectious or inflammatory      Electronically signed by: Humera Curtis  Date:    04/25/2025  Time:    11:17               Narrative:    EXAMINATION:  CT RENAL STONE STUDY ABD PELVIS WO    CLINICAL HISTORY:  Flank pain, kidney stone suspected;    TECHNIQUE:  CT imaging was performed of the abdomen and pelvis without intravenous contrast. Dose length product is 636 mGycm. Automatic exposure control, adjustment of mA/kV or iterative reconstruction technique was used to limit radiation dose.    COMPARISON:  None    FINDINGS:  Assessment of the visceral organs and vasculature is limited by the lack of IV contrast.    Liver/biliary: No concerning hepatic findings. No radiodense gallstone or biliary dilatation appreciated.    Pancreas: Normal.    Spleen: Normal.    Adrenals: Normal.    Kidneys, ureters and bladder: There is no obstructing urinary calculus.  There is mild left perinephric stranding.  There is no hydronephrosis.  The bladder is within normal limits.    Reproductive  organs: No pelvic masses.    Stomach/bowel: No evidence of bowel obstruction. Normal appendix. No discernible bowel inflammation.    Lymph nodes: No pathologically enlarged lymph node identified with noncontrast technique.    Peritoneum: No ascites or free air.    Vessels: Normal abdominal aortic caliber.    Abdominal wall: Normal.    Lung bases: No consolidation or pleural effusion.    Bones: No acute osseous findings.                                       Medications   sodium chloride 0.9% bolus 1,000 mL 1,000 mL (1,000 mLs Intravenous New Bag 4/25/25 0909)   ondansetron injection 4 mg (4 mg Intravenous Given 4/25/25 0948)   cefTRIAXone injection 1 g (1 g Intravenous Given 4/25/25 1128)   acetaminophen tablet 1,000 mg (1,000 mg Oral Given 4/25/25 1129)     Medical Decision Making  Differential diagnosis include Pyelonephritis, Cystitis, Kidney stones.    Vitals are stable.   Labs revealed leukocytosis with WBC of 17.88, mild hyponatremia (134), lactic acid nml, UPT negative, U/A + for leukocytes, blood and protein, trace bacteria.  CT AP revealed no renal stones but showed mild left perinephric stranding, may be infectious or inflammatory.     Blood cx x 2  drawn. One time dose of IV Rocephin given based on recent urine culture sensitivities. IV fluid bolus and zofran given. Patient was reevaluated at bedside and reported improvement in her symptoms.     At this time, she is stable for discharge home. She was sent rx for additional 7 day course of Bactrim to complete 14 days of abx.  Rx also sent for Zofran.     Problems Addressed:  Leukocytosis, unspecified type: acute illness or injury  Pyelonephritis of left kidney: acute illness or injury that poses a threat to life or bodily functions    Amount and/or Complexity of Data Reviewed  External Data Reviewed: notes.     Details: Urgent care  Labs: ordered.  Radiology: ordered.    Risk  OTC drugs.  Prescription drug management.              Attending Attestation:    Physician Attestation Statement for Resident:  As the supervising MD   Physician Attestation Statement: I have personally seen and examined this patient.   I agree with the above history.  -: See ed course for attestation, not septic, well appearing   As the supervising MD I agree with the above PE.     As the supervising MD I agree with the above treatment, course, plan, and disposition.    I have reviewed and agree with the residents interpretation of the following: lab data and CT scans.  I have reviewed the following: old records at this facility.                ED Course as of 04/25/25 1317   Fri Apr 25, 2025   1041 I personally made/approved the management plan for this patient and take responsibility for the patient management. I reviewed the DEACON/resident resident physician's documentation, agree with the DEACON/resident's assessment, and I had face to face time with the patient. See my independent MDM below.     My MDM:  ED assessment: 25 yo F with h/o recurrent uti's presenting to the ed for evaluation of fever. She started having flank pain 5 days ago and fever the following day, she went to urgent care 2 days ago and was diagnosd with a uti, prescribed bactrim for which she's taken 3 doses and continuing to have nausea, flank pain and fever, tmax 102 last night. No previous episodes of similar symptoms, no hematuria, dysuria  Pertinent vitals/exam findings: mildly tachycardic  Normocephalic,a traumatic  Mildly tachycardic, regular rhythm  Abd soft and nontender  Left cva tenderness to palpation  Lungs clear  DDx: pyelo, ureterolithaisis, dehydration, uti  Review of labs/results: leukocytosis, lactic and urine pending  My independent EKG/radiology interpretation: ct pending  Discussion of management with other providers: resident  ED management: workup, ivf, abx dose  ED disposition/plan: pending workup  Critical Care: none        Angelika Pride MD  Emergency Medicine        [BS]   1121 No ureteral  calculus [BS]   1121 If able to be discharged, arturo lneed rx zofran and bactrim rx will need to be longer than the 7 days prescribed and more like 10-14 days [BS]      ED Course User Index  [BS] Angelika Pride MD                       Clinical Impression:  Final diagnoses:  [D72.829] Leukocytosis, unspecified type  [N12] Pyelonephritis of left kidney (Primary)          ED Disposition Condition    Discharge Stable          ED Prescriptions       Medication Sig Dispense Start Date End Date Auth. Provider    ondansetron (ZOFRAN-ODT) 4 MG TbDL Take 1 tablet (4 mg total) by mouth every 6 (six) hours as needed (nausea). 20 tablet 4/25/2025 -- Angelika Pride MD    sulfamethoxazole-trimethoprim 800-160mg (BACTRIM DS) 800-160 mg Tab Take 1 tablet by mouth 2 (two) times daily. To be taken once initial prescription for bactrim is completed for a total of 14 days for 7 days 14 tablet 4/25/2025 5/2/2025 Angelika Pride MD          Follow-up Information       Follow up With Specialties Details Why Contact Info    Isabel Schrader MD Family Medicine Schedule an appointment as soon as possible for a visit   [address] [place] [id number]  [phone]      [facility] - Emergency Dept Emergency Medicine  As needed, If symptoms worsen [address] [facility] [id number] [phone]               Ana Garza MD  Resident  04/25/25 1204         [1]   Social History  Tobacco Use    Smoking status: Never    Smokeless tobacco: Never   Substance Use Topics    Alcohol use: Yes     Comment: occasional    Drug use: Never        Angelika Pride MD  04/25/25 3631

## 2025-04-25 NOTE — DISCHARGE INSTRUCTIONS
the other prescription for bactrim to extend your total prescription to 14 days to ensure adequate treatment for kidney infection

## 2025-04-30 LAB
BACTERIA BLD CULT: NORMAL
BACTERIA BLD CULT: NORMAL

## 2025-05-28 ENCOUNTER — PATIENT OUTREACH (OUTPATIENT)
Facility: CLINIC | Age: 25
End: 2025-05-28
Payer: COMMERCIAL

## 2025-05-28 NOTE — PROGRESS NOTES
Population Health Outreach.    Patient is not due for any topics at this time.                    Updated Care Team:Malathi Robles GYN     Digital Medicine: Not Eligible     Next Primary Care Visit Date: 6/12/2025

## 2025-05-29 ENCOUNTER — PATIENT MESSAGE (OUTPATIENT)
Dept: PRIMARY CARE CLINIC | Facility: CLINIC | Age: 25
End: 2025-05-29
Payer: COMMERCIAL

## 2025-05-29 DIAGNOSIS — D72.829 LEUKOCYTOSIS, UNSPECIFIED TYPE: ICD-10-CM

## 2025-05-29 DIAGNOSIS — E66.811 CLASS 1 OBESITY WITH BODY MASS INDEX (BMI) OF 33.0 TO 33.9 IN ADULT, UNSPECIFIED OBESITY TYPE, UNSPECIFIED WHETHER SERIOUS COMORBIDITY PRESENT: ICD-10-CM

## 2025-05-29 DIAGNOSIS — Z20.5 EXPOSURE TO HEPATITIS B: ICD-10-CM

## 2025-05-29 DIAGNOSIS — E55.9 VITAMIN D DEFICIENCY: ICD-10-CM

## 2025-06-02 ENCOUNTER — LAB VISIT (OUTPATIENT)
Dept: LAB | Facility: HOSPITAL | Age: 25
End: 2025-06-02
Attending: STUDENT IN AN ORGANIZED HEALTH CARE EDUCATION/TRAINING PROGRAM
Payer: COMMERCIAL

## 2025-06-02 DIAGNOSIS — Z20.5 EXPOSURE TO HEPATITIS B: ICD-10-CM

## 2025-06-02 PROCEDURE — 36415 COLL VENOUS BLD VENIPUNCTURE: CPT

## 2025-06-02 PROCEDURE — 80074 ACUTE HEPATITIS PANEL: CPT

## 2025-06-02 PROCEDURE — 86706 HEP B SURFACE ANTIBODY: CPT

## 2025-06-03 LAB
HAV IGM SERPL QL IA: NONREACTIVE
HBV CORE IGM SERPL QL IA: NONREACTIVE
HBV SURFACE AB SER QL IA: POSITIVE
HBV SURFACE AB SERPL IA-ACNC: 123 MIU/ML
HBV SURFACE AG SERPL QL IA: NONREACTIVE
HCV AB SERPL QL IA: NONREACTIVE

## 2025-06-04 LAB — PATH REV: NORMAL

## 2025-06-05 ENCOUNTER — RESULTS FOLLOW-UP (OUTPATIENT)
Dept: PRIMARY CARE CLINIC | Facility: CLINIC | Age: 25
End: 2025-06-05

## 2025-06-09 NOTE — PROGRESS NOTES
Date: 06/12/2025  Patient ID: [id number]   Chief Complaint: Annual Exam (Labs drawn were only hep b panel )    HPI:   Rosalba Alejandro is a 24 y.o. female here today for an annual wellness visit. Reviewed and discussed lab results. Hep panel showed positive Ab.  Previous labs in the ER showed elevated WBCs and glucose.  She is concerned about hepatitis-B, since her partner has been off of his medication for a year    Overall she feels well today. 2 mos ago pt tx for pyelonephritis. She does have intermittent 4/10 upper back pain related to work. Tylenol does not work well, but she tried Meloxicam, which helped.  She would like x-rays and meloxicam as well    Diet: average; trying to drink more water  Activity level: walks    Patient Active Problem List   Diagnosis    Annual physical exam    PCOS (polycystic ovarian syndrome)    Exposure to hepatitis B    Vitamin D deficiency    Leukocytosis    Upper back pain    Hepatitis B antibody positive    Obesity (BMI 30-39.9)    Hyperglycemia     Outpatient Medications Marked as Taking for the 6/12/25 encounter (Office Visit) with Isabel Schrader MD   Medication Sig Dispense Refill    norethindrone-ethinyl estradiol (JUNEL FE 1/20) 1 mg-20 mcg (21)/75 mg (7) per tablet Take 1 tablet by mouth once daily. 30 tablet 2    ondansetron (ZOFRAN-ODT) 4 MG TbDL Take 1 tablet (4 mg total) by mouth every 6 (six) hours as needed (nausea). 20 tablet 0     Past Medical History:   Diagnosis Date    Anemia, unspecified     Exposure to hepatitis B     IGT (impaired glucose tolerance) 12/07/2023    PCOS (polycystic ovarian syndrome)     9 mos on contraceptive, keeps regular    Recurrent UTI     Tension headache     Urine culture positive 12/07/2023     Past Surgical History:   Procedure Laterality Date    NO PAST SURGERIES       Review of patient's allergies indicates:  No Known Allergies  Family History   Problem Relation Name Age of Onset    Hypertension Mother Mikayla Alejandro      Hypertension Father Jose Alejandro       Social History     Socioeconomic History    Marital status:    Occupational History    Occupation: nurse at [employer]   Tobacco Use    Smoking status: Never    Smokeless tobacco: Never   Substance and Sexual Activity    Alcohol use: Not Currently     Comment: occasional    Drug use: Never    Sexual activity: Yes     Partners: Male     Birth control/protection: OCP     Comment:    Social History Narrative    planning     Social Drivers of Health     Financial Resource Strain: Low Risk  (1/21/2025)    Overall Financial Resource Strain (CARDIA)     Difficulty of Paying Living Expenses: Not very hard   Food Insecurity: No Food Insecurity (1/21/2025)    Hunger Vital Sign     Worried About Running Out of Food in the Last Year: Never true     Ran Out of Food in the Last Year: Never true   Transportation Needs: No Transportation Needs (12/18/2023)    PRAPARE - Transportation     Lack of Transportation (Medical): No     Lack of Transportation (Non-Medical): No   Physical Activity: Sufficiently Active (1/21/2025)    Exercise Vital Sign     Days of Exercise per Week: 3 days     Minutes of Exercise per Session: 50 min   Stress: No Stress Concern Present (1/21/2025)    [employer] - Occupational Stress Questionnaire     Feeling of Stress : Only a little   Housing Stability: Low Risk  (12/18/2023)    Housing Stability Vital Sign     Unable to Pay for Housing in the Last Year: No     Number of Places Lived in the Last Year: 1     Unstable Housing in the Last Year: No     Patient Care Team:  Isabel Schrader MD as PCP - General (Family Medicine)  Cecilia Anne LPN as Care Coordinator  Malathi Robles MD as Consulting Physician (Obstetrics and Gynecology)     Subjective:     Review of Systems   Constitutional: Negative.  Negative for fever and weight loss.   HENT:  Negative for congestion, hearing loss and sore throat.    Eyes:  Negative  "for blurred vision.   Respiratory:  Negative for cough and shortness of breath.    Cardiovascular:  Negative for chest pain, palpitations and leg swelling.   Gastrointestinal:  Negative for abdominal pain, blood in stool, constipation, diarrhea, nausea and vomiting.   Genitourinary:  Negative for dysuria, frequency, hematuria and urgency.   Musculoskeletal:  Positive for back pain. Negative for joint pain.   Skin:  Negative for rash.   Neurological:  Negative for weakness and headaches.   Psychiatric/Behavioral:  Negative for depression. The patient is not nervous/anxious and does not have insomnia.        See HPI for details  All Other ROS: Negative except as stated in HPI    Objective:     /86   Pulse 88   Ht 4' 11" (1.499 m)   Wt 74 kg (163 lb 1.6 oz)   SpO2 96%   BMI 32.94 kg/m²     Physical Exam  Vitals reviewed.   Constitutional:       Appearance: Normal appearance.   HENT:      Head: Normocephalic and atraumatic.      Right Ear: Tympanic membrane, ear canal and external ear normal.      Left Ear: Tympanic membrane, ear canal and external ear normal.      Nose: Nose normal. No congestion or rhinorrhea.      Mouth/Throat:      Mouth: Mucous membranes are moist.      Pharynx: Oropharynx is clear.   Eyes:      General: No scleral icterus.     Extraocular Movements: Extraocular movements intact.      Conjunctiva/sclera: Conjunctivae normal.   Cardiovascular:      Rate and Rhythm: Normal rate and regular rhythm.      Pulses: Normal pulses.      Heart sounds: Normal heart sounds.   Pulmonary:      Effort: Pulmonary effort is normal.      Breath sounds: Normal breath sounds.   Abdominal:      General: Abdomen is flat. Bowel sounds are normal.      Palpations: Abdomen is soft.      Tenderness: There is no abdominal tenderness.   Musculoskeletal:         General: No swelling or deformity. Normal range of motion.      Cervical back: Normal range of motion and neck supple.      Comments: Full painless AROM " bilateral shoulders, thoraco cervical spine and paraspinal muscles nontender to palpation   Skin:     General: Skin is warm and dry.   Neurological:      Mental Status: She is alert and oriented to person, place, and time.   Psychiatric:         Mood and Affect: Mood normal.         Behavior: Behavior normal.         Thought Content: Thought content normal.         Judgment: Judgment normal.         Assessment:       ICD-10-CM ICD-9-CM   1. Annual physical exam  Z00.00 V70.0   2. Upper back pain  M54.9 724.5   3. Hepatitis B antibody positive  R76.8 795.79   4. Exposure to hepatitis B  Z20.5 V01.79   5. Obesity (BMI 30-39.9)  E66.9 278.00   6. Leukocytosis, unspecified type  D72.829 288.60   7. Hyperglycemia  R73.9 790.29        Plan:     1. Annual physical exam  Assessment & Plan:  Provided Rosalba Alejandro with a 5-10 year written screening schedule and personal prevention plan. Recommendations were developed using the USPSTF age appropriate recommendations. Education, counseling, and referrals were provided as needed. After Visit Summary printed and given to patient which includes a list of additional screenings\tests needed.         2. Upper back pain  Assessment & Plan:  Attempt meloxicam 7.5 mg daily p.r.n.   Obtain cervical spine x-ray  Avoid exacerbating factors    Orders:  -     meloxicam (MOBIC) 7.5 MG tablet; Take 1 tablet (7.5 mg total) by mouth once daily.  Dispense: 30 tablet; Refill: 5  -     X-Ray Cervical Spine Complete 5 view; Future; Expected date: 06/12/2025    3. Hepatitis B antibody positive  Assessment & Plan:  Obtain hep B viral load  Hep B booster every 5yrs    Orders:  -     HEPATITIS B VIRAL DNA, QUANTITATIVE; Future; Expected date: 06/12/2025  -     hepatitis B virus vacc.rec,PF, (ENGERIX-B) 20 mcg/mL Syrg; Inject 1 mL (20 mcg total) into the muscle once. for 1 dose  Dispense: 1 mL; Refill: 0    4. Exposure to hepatitis B  Assessment & Plan:  Obtain hep B viral load  Hep B booster every  5yrs    Orders:  -     HEPATITIS B VIRAL DNA, QUANTITATIVE; Future; Expected date: 06/12/2025  -     hepatitis B virus vacc.rec,PF, (ENGERIX-B) 20 mcg/mL Syrg; Inject 1 mL (20 mcg total) into the muscle once. for 1 dose  Dispense: 1 mL; Refill: 0    5. Obesity (BMI 30-39.9)  Assessment & Plan:  Body mass index is 32.94 kg/m².  For BMI 25-30: recommend behavior modifications  Recommend intensive, multicomponent, behavioral interventions: Goal BMI <30.  -Goal is to exercise at least 5 times a week for 30 minutes per day.   -Stand more each day.   -Increase exercise: Start with 10 minutes daily and build up to 60-90 minutes/day with moderate activity  -Reduce caloric intake to about 1500 kcal/day  -Avoid soda, simple sugars, excessive rice, potatoes or bread. Limit fast foods and fried foods.  -Choose complex carbs in moderation (example: green vegetables, beans, oatmeal). Eat plenty of fresh fruits and vegetables with lean meats daily.  -Do not skip meals. Eat a balanced portion size.  -Avoid fad diets. Consider long-term healthy life style changes.       Orders:  -     Hemoglobin A1C; Future; Expected date: 06/12/2025  -     Lipid Panel; Future; Expected date: 06/12/2025  -     CBC Auto Differential; Future; Expected date: 06/12/2025    6. Leukocytosis, unspecified type  Assessment & Plan:  Rpt CBC    Orders:  -     CBC Auto Differential; Future; Expected date: 06/12/2025    7. Hyperglycemia  Assessment & Plan:  Obtain A1c  Discussed lifestyle modifications    Orders:  -     Hemoglobin A1C; Future; Expected date: 06/12/2025         Medication List with Changes/Refills   New Medications    HEPATITIS B VIRUS VACC.REC,PF, (ENGERIX-B) 20 MCG/ML SYRG    Inject 1 mL (20 mcg total) into the muscle once. for 1 dose       Start Date: 6/12/2025 End Date: 6/12/2025    MELOXICAM (MOBIC) 7.5 MG TABLET    Take 1 tablet (7.5 mg total) by mouth once daily.       Start Date: 6/12/2025 End Date: --   Current Medications    ESTRADIOL  (ESTRACE) 1 MG TABLET    Take 1 tablet (1 mg total) by mouth once daily.       Start Date: 12/24/2024End Date: --    NORETHINDRONE-ETHINYL ESTRADIOL (JUNEL FE 1/20) 1 MG-20 MCG (21)/75 MG (7) PER TABLET    Take 1 tablet by mouth once daily.       Start Date: 12/24/2024End Date: --    ONDANSETRON (ZOFRAN-ODT) 4 MG TBDL    Take 1 tablet (4 mg total) by mouth every 6 (six) hours as needed (nausea).       Start Date: 4/25/2025 End Date: --        The patient's Health Maintenance was reviewed and the following appears to be due at this time:   Health Maintenance Due   Topic Date Due    HPV Vaccines (1 - 3-dose series) Never done    TETANUS VACCINE  Never done    COVID-19 Vaccine (1 - 2024-25 season) Never done     Health Maintenance Topics with due status: Not Due       Topic Last Completion Date    Pap Smear 12/19/2023    RSV Vaccine (Age 60+ and Pregnant patients) Not Due        Alcohol/Tobacco Use - Discussed importance of smoking avoidance/cessation and limiting alcohol intake.    CVD Risk Factors - Reviewed and discussed with patient    Obesity/Physical Activity - BMI = Body mass index is 32.94 kg/m².. Encouraged 30 minute daily physical activity, 5 days per week.     STD screening (At least once 15-65y or when necessary) - negative     Depression screening (Every year or when necessary)- negative    Osteoporosis Screening (start at 64yo or 50y with risk) - future    Colon Cancer Screening - future    Cervical Cancer Screening - 2023 benign pap    Breast Cancer Screening - future    Eye Exam - Recommend annual eye exams.    Dental Exam - Recommend biannual exams.     Vaccinations - will obtain at pharmacy    Follow up in about 1 year (around 6/12/2026) for 45min for Annual with pap, depending on labs may be sooner. In addition to their scheduled follow up, the patient has also been instructed to follow up on as needed basis.

## 2025-06-12 ENCOUNTER — OFFICE VISIT (OUTPATIENT)
Dept: PRIMARY CARE CLINIC | Facility: CLINIC | Age: 25
End: 2025-06-12
Payer: COMMERCIAL

## 2025-06-12 VITALS
BODY MASS INDEX: 32.88 KG/M2 | DIASTOLIC BLOOD PRESSURE: 86 MMHG | HEIGHT: 59 IN | OXYGEN SATURATION: 96 % | HEART RATE: 88 BPM | WEIGHT: 163.13 LBS | SYSTOLIC BLOOD PRESSURE: 125 MMHG

## 2025-06-12 DIAGNOSIS — M54.9 UPPER BACK PAIN: ICD-10-CM

## 2025-06-12 DIAGNOSIS — R76.8 HEPATITIS B ANTIBODY POSITIVE: ICD-10-CM

## 2025-06-12 DIAGNOSIS — E66.9 OBESITY (BMI 30-39.9): ICD-10-CM

## 2025-06-12 DIAGNOSIS — Z00.00 ANNUAL PHYSICAL EXAM: Primary | ICD-10-CM

## 2025-06-12 DIAGNOSIS — R73.9 HYPERGLYCEMIA: ICD-10-CM

## 2025-06-12 DIAGNOSIS — Z20.5 EXPOSURE TO HEPATITIS B: ICD-10-CM

## 2025-06-12 DIAGNOSIS — D72.829 LEUKOCYTOSIS, UNSPECIFIED TYPE: ICD-10-CM

## 2025-06-12 RX ORDER — MELOXICAM 7.5 MG/1
7.5 TABLET ORAL DAILY
Qty: 30 TABLET | Refills: 5 | Status: SHIPPED | OUTPATIENT
Start: 2025-06-12

## 2025-06-12 NOTE — ASSESSMENT & PLAN NOTE
Body mass index is 32.94 kg/m².  For BMI 25-30: recommend behavior modifications  Recommend intensive, multicomponent, behavioral interventions: Goal BMI <30.  -Goal is to exercise at least 5 times a week for 30 minutes per day.   -Stand more each day.   -Increase exercise: Start with 10 minutes daily and build up to 60-90 minutes/day with moderate activity  -Reduce caloric intake to about 1500 kcal/day  -Avoid soda, simple sugars, excessive rice, potatoes or bread. Limit fast foods and fried foods.  -Choose complex carbs in moderation (example: green vegetables, beans, oatmeal). Eat plenty of fresh fruits and vegetables with lean meats daily.  -Do not skip meals. Eat a balanced portion size.  -Avoid fad diets. Consider long-term healthy life style changes.      detailed exam

## 2025-06-30 ENCOUNTER — HOSPITAL ENCOUNTER (OUTPATIENT)
Dept: RADIOLOGY | Facility: HOSPITAL | Age: 25
Discharge: HOME | End: 2025-06-30
Attending: STUDENT IN AN ORGANIZED HEALTH CARE EDUCATION/TRAINING PROGRAM
Payer: COMMERCIAL

## 2025-06-30 ENCOUNTER — RESULTS FOLLOW-UP (OUTPATIENT)
Dept: PRIMARY CARE CLINIC | Facility: CLINIC | Age: 25
End: 2025-06-30

## 2025-06-30 DIAGNOSIS — M54.9 UPPER BACK PAIN: ICD-10-CM

## 2025-06-30 PROCEDURE — 72050 X-RAY EXAM NECK SPINE 4/5VWS: CPT | Mod: TC

## 2025-07-09 ENCOUNTER — PATIENT MESSAGE (OUTPATIENT)
Dept: FAMILY MEDICINE | Facility: CLINIC | Age: 25
End: 2025-07-09
Payer: COMMERCIAL